# Patient Record
Sex: FEMALE | Race: WHITE | Employment: FULL TIME | URBAN - METROPOLITAN AREA
[De-identification: names, ages, dates, MRNs, and addresses within clinical notes are randomized per-mention and may not be internally consistent; named-entity substitution may affect disease eponyms.]

---

## 2017-01-17 ENCOUNTER — OFFICE VISIT (OUTPATIENT)
Dept: FAMILY MEDICINE | Facility: CLINIC | Age: 37
End: 2017-01-17
Payer: COMMERCIAL

## 2017-01-17 VITALS
SYSTOLIC BLOOD PRESSURE: 114 MMHG | BODY MASS INDEX: 26.92 KG/M2 | HEART RATE: 58 BPM | TEMPERATURE: 97.3 F | DIASTOLIC BLOOD PRESSURE: 57 MMHG | OXYGEN SATURATION: 97 % | WEIGHT: 130 LBS

## 2017-01-17 DIAGNOSIS — O35.CXX0 PREGNANCY COMPLICATED BY FETAL LUNG LESION, NOT APPLICABLE OR UNSPECIFIED FETUS: ICD-10-CM

## 2017-01-17 DIAGNOSIS — Q24.9 CONGENITAL ANOMALIES OF THE HEART: Primary | ICD-10-CM

## 2017-01-17 PROCEDURE — T1013 SIGN LANG/ORAL INTERPRETER: HCPCS | Mod: U3 | Performed by: FAMILY MEDICINE

## 2017-01-17 PROCEDURE — 99214 OFFICE O/P EST MOD 30 MIN: CPT | Performed by: FAMILY MEDICINE

## 2017-01-17 NOTE — MR AVS SNAPSHOT
After Visit Summary   1/17/2017    Adina Sánchez    MRN: [id number]           Patient Information     Date Of Birth          1980        Visit Information        Provider Department      1/17/2017 3:20 PM Willis Duenas MD; [employer] [facility]         Follow-ups after your visit        Your next 10 appointments already scheduled     Jan 30, 2017  3:20 PM   SHORT with Lauren Anneliese Engelmann, MD   [facility] ([facility])    [address] [id number] [phone]            May 03, 2017  8:00 AM   CT CHEST ANGIO W/O & W CONTRAST with UUCT4   [facility], [place], [place] ([facility], [facility])    [address] [address] [phone]           Please bring any scans or X-rays taken at other hospitals, if similar tests were done. Also bring a list of your medicines, including vitamins, minerals and over-the-counter drugs. It is safest to leave personal items at home.  Be sure to tell your doctor:   If you have any allergies.   If there s any chance you are pregnant.   If you are breastfeeding.   If you have any special needs.  You will have contrast for this exam. To prepare:   Do not eat or drink for 2 hours before your exam. If you need to take medicine, you may take it with small sips of water. (We may ask you to take liquid medicine as well.)   The day before your exam, drink extra fluids at least six 8-ounce glasses (unless your doctor tells you to restrict your fluids).  Patients over 70 or patients with diabetes or kidney problems:   If you haven t had a blood test (creatinine test) within the last 30 days, go to your clinic or Diagnostic Imaging Department for this test.  If you have diabetes:   If your kidney function is normal, continue taking your metformin (Avandamet, Glucophage, Glucovance,  "Metaglip) on the day of your exam.   If your kidney function is abnormal, wait 48 hours before restarting this medicine.  Please wear loose clothing, such as a sweat suit or jogging clothes. Avoid snaps, zippers and other metal. We may ask you to undress and put on a hospital gown.  If you have any questions, please call the Imaging Department where you will have your exam.            May 23, 2017  9:00 AM   (Arrive by 8:45 AM)   RETURN CONGENITAL HEART with Amaury Gomez MD   [facility] ([facility])    [address]  3rd Floor  [facility] [address] [phone]              Who to contact     If you have questions or need follow up information about today's clinic visit or your schedule please contact [facility] directly at [phone].  Normal or non-critical lab and imaging results will be communicated to you by MyChart, letter or phone within 4 business days after the clinic has received the results. If you do not hear from us within 7 days, please contact the clinic through MyChart or phone. If you have a critical or abnormal lab result, we will notify you by phone as soon as possible.  Submit refill requests through wrenchguys mobile or call your pharmacy and they will forward the refill request to us. Please allow 3 business days for your refill to be completed.          Additional Information About Your Visit        Yuenimeihart Information     wrenchguys mobile lets you send messages to your doctor, view your test results, renew your prescriptions, schedule appointments and more. To sign up, go to [email or web address] . Click on \"Log in\" on the left side of the screen, which will take you to the Welcome page. Then click on \"Sign up Now\" on the right side of the page.     You will be asked to enter the access code listed below, as well as some personal information. Please follow the directions to create your username and password.     Your access code " is: [email or web address]  Expires: 2017  5:31 AM     Your access code will  in 90 days. If you need help or a new code, please call your [place] clinic or [phone].        Care EveryWhere ID     This is your Care EveryWhere ID. This could be used by other organizations to access your [place] medical records  [id number]        Your Vitals Were     Pulse Temperature Pulse Oximetry Last Period Breastfeeding?       58 97.3  F (36.3  C) (Oral) 97% 04/10/2016 No        Blood Pressure from Last 3 Encounters:   17 114/57   16 124/71    Weight from Last 3 Encounters:   17 130 lb (58.968 kg)   16 140 lb 8 oz (63.73 kg)              Today, you had the following     No orders found for display       Primary Care Provider Office Phone # Fax #    Pinky Perez [phone]       [facility] [address] [place] [address] [address]  [facility] [address]        Thank you!     Thank you for choosing [facility]  for your care. Our goal is always to provide you with excellent care. Hearing back from our patients is one way we can continue to improve our services. Please take a few minutes to complete the written survey that you may receive in the mail after your visit with us. Thank you!             Your Updated Medication List - Protect others around you: Learn how to safely use, store and throw away your medicines at www.disposemymeds.org.          This list is accurate as of: 17  4:37 PM.  Always use your most recent med list.                   Brand Name Dispense Instructions for use    Prenatal Vitamin 27-0.8 MG Tabs

## 2017-01-17 NOTE — NURSING NOTE
"Chief Complaint   Patient presents with     Other     Check  Incision       Initial /57 mmHg  Pulse 58  Temp(Src) 97.3  F (36.3  C) (Oral)  Wt 130 lb (58.968 kg)  SpO2 97%  LMP 04/10/2016  Breastfeeding? No Estimated body mass index is 26.92 kg/(m^2) as calculated from the following:    Height as of 16: 4' 10.25\" (1.48 m).    Weight as of this encounter: 130 lb (58.968 kg).  BP completed using cuff size: regular  Reji MCKEON      "

## 2017-01-17 NOTE — PROGRESS NOTES
"  SUBJECTIVE:                                                    Adina Sánchez is a 36 year old female who presents to clinic today for the following health issues:  {Provider please address medication reconciliation discrepancies--rooming staff please delete if no med/rec issues}    Patient is here to check c section incision.     {additional problems for provider to add:719271}    Problem list and histories reviewed & adjusted, as indicated.  Additional history: {NONE - AS DOCUMENTED:213120::\"as documented\"}    {HIST REVIEW/ LINKS 2:981571}    {PROVIDER CHARTING PREFERENCE:431426}    "

## 2017-01-17 NOTE — PROGRESS NOTES
SUBJECTIVE:                                                    Adina Sánchez is a 36 year old female who presents to clinic today for the following health issues:    Check  Incision    She wants to know how the    Scar is doing   She is not having any problems   She is breast feeding   She is not using anything for birth control at this point     She would like to get an  IUD     Patient is not having any problems with the child   Breast feeding is going ok   This is the second child     No depression symptoms     No fever   No chills   No drainage from the scar     She had a history of some heart surgery.  This was done through an incision in the axilla. Records are  Not readily availabe, Surgery was done in [facility] when she was 11.  Per review of her echo done at [facility], no evidence of valvular surgery though this is what she said was done.  They presumed based on findings she had surgery to close and ASD and VSD.     She may have had some problems in utero.  Child supposedly had a mass that pushed the heart to the right.  She stated in the end nothing was found and the baby appears healthy now.     O: /57 mmHg  Pulse 58  Temp(Src) 97.3  F (36.3  C) (Oral)  Wt 130 lb (58.968 kg)  SpO2 97%  LMP 04/10/2016  Breastfeeding? No    The abdomen is soft without tenderness, guarding, mass, rebound or organomegaly. Bowel sounds are normal. No CVA tenderness or inguinal adenopathy noted.    Pfannenstiel scar present with good healing   No discharge or tenderness   Typical raises redness on left side of scar where suture was tied, but no discharge to indicate stitch abscess.       ICD-10-CM    1. Patient [facility] by [facility] Q24.9    2. Pregnancy complicated by fetal lung lesion, not applicable or unspecified fetus O35.8XX0      Normal initial post partum evaluation   Patient will have post partum check scheduled   She plans on having  "an IUD placed and a follow up appointment was scheduled for this  An  will be needed     She will bring additional information from [facility] for subsequent visits  Until congenital issues are clarified she should continue care with current pediatricain for the child     I do not think she will need follow up for her \"cardiac Problem.\"  Her diagnosis was listed as cardiomyopathy, but I find no evidence of that in the records.     Total time spent face-to-face with the patient: 45 minutes.  Greater than 50% of time was spent in counseling.     "

## 2017-01-30 ENCOUNTER — OFFICE VISIT (OUTPATIENT)
Dept: FAMILY MEDICINE | Facility: CLINIC | Age: 37
End: 2017-01-30
Payer: COMMERCIAL

## 2017-01-30 VITALS
OXYGEN SATURATION: 99 % | SYSTOLIC BLOOD PRESSURE: 111 MMHG | DIASTOLIC BLOOD PRESSURE: 68 MMHG | BODY MASS INDEX: 27.13 KG/M2 | HEART RATE: 62 BPM | WEIGHT: 131 LBS | TEMPERATURE: 97.4 F

## 2017-01-30 DIAGNOSIS — Z30.09 ENCOUNTER FOR OTHER GENERAL COUNSELING OR ADVICE ON CONTRACEPTION: Primary | ICD-10-CM

## 2017-01-30 DIAGNOSIS — K43.9 VENTRAL HERNIA WITHOUT OBSTRUCTION OR GANGRENE: ICD-10-CM

## 2017-01-30 PROCEDURE — 99213 OFFICE O/P EST LOW 20 MIN: CPT | Performed by: FAMILY MEDICINE

## 2017-01-30 PROCEDURE — T1013 SIGN LANG/ORAL INTERPRETER: HCPCS | Mod: U3 | Performed by: FAMILY MEDICINE

## 2017-01-30 ASSESSMENT — PAIN SCALES - GENERAL: PAINLEVEL: NO PAIN (0)

## 2017-01-30 NOTE — MR AVS SNAPSHOT
After Visit Summary   1/30/2017    Adina Sánchez    MRN: [id number]           Patient Information     Date Of Birth          1980        Visit Information        Provider Department      1/30/2017 3:15 PM Engelmann, Lauren Anneliese, MD; [facility]        Today's Diagnoses     Encounter for other general counseling or advice on contraception    -  1     Ventral hernia without obstruction or gangrene           Care Instructions    Control de la natalidad: El dispositivo intrauterino  El dispositivo intrauterino (abreviado DIU) es un aparato pequeño y flexible con forma de T que un proveedor de atención médica capacitado coloca dentro del útero. El DIU se cuenta entre los métodos anticonceptivos más eficaces y además es reversible, lo que significa que puede ser extraído en cualquier momento por un proveedor de atención médica capacitado. Los nuevos DIU son seguros y no acarrean los riesgos asociados a los DIU más antiguos.    Tasa de embarazo  Hable con garcia proveedor de atención médica acerca de la eficacia de jazmyn método de control de la natalidad.  Tipos de DIU  Hay dos tipos de DIU disponibles:    El DIU de cobre: libera samira pequeña cantidad de cobre en el útero, lo cual dificulta que los espermatozoides lleguen hasta el óvulo. Jazmyn dispositivo es efectivo ronald mínimo max 10 años.    El DIU con progestágeno: libera la hormona progestágeno (progesterona sintética), la cual ocasiona cambios en el útero que ayudan a prevenir el embarazo. Jazmyn dispositivo es efectivo ronald mínimo max 5 años y podría recomendarse a mujeres que tienen anemia o menstruaciones abundantes o dolorosas.  Los DIU tienen unos pequeños hilos que cuelgan de la abertura del útero hacia la vagina, los cuales permiten comprobar que el dispositivo permanezca en garcia posición.  Lo que debe saber sobre los DIU    Pueden usarlos mujeres que nunca villalba estado embarazadas o que tienen  antecedentes de ITS o de embarazos tubáricos.    No se desplazarán del útero a ninguna otra parte del cuerpo.    Acarrean un ligero riesgo de ser expulsados por la vagina.    Podrían no funcionar en mujeres que tienen un útero de forma anormal.    El DIU de cobre puede aumentar el flujo menstrual y causar cólicos.    El DIU que contiene progestágeno puede reducir el flujo menstrual o hacer que desaparezcan las menstruaciones (es posible y normal que se produzca manchado max los primeros 3 meses).  Asegúrese de que garcia proveedor de atención médica sepa si usted     Tiene samira ITS o podría tenerla.    Tiene problemas del hígado.    Tiene coágulos de vilma (sólo en el beny del DIU que contiene progestágeno).    Tiene cáncer de seno o antecedentes de cáncer de seno (sólo en el beny del DIU que contiene progestágeno).     [id number] [employer] [address] [place] [address], [place] [address]. Todos los derechos reservados. Esta información no pretende sustituir la atención médica profesional. Sólo garcia médico puede diagnosticar y tratar un problema de maria isabel.              Follow-ups after your visit        Your next 10 appointments already scheduled     Feb 06, 2017  1:20 PM   Office Visit with Lauren Anneliese Engelmann, MD   [facility] ([facility])    [address] [place] [address] [phone]           Bring a current list of meds and any records pertaining to this visit.  For Physicals, please bring immunization records and any forms needing to be filled out.  Please arrive 10 minutes early to complete paperwork.            Feb 08, 2017  9:00 AM   (Arrive by 8:45 AM)   New Plastic Surgery with KRIS Loya MD   [facility] ([facility])    [address] [address]   [phone]           Do not wear perfume.            May 03, 2017   8:00 AM   CT CHEST ANGIO W/O & W CONTRAST with UUCT4   [facility], [place], [place] ([facility])    [address] [address] [phone]           Please bring any scans or X-rays taken at other hospitals, if similar tests were done. Also bring a list of your medicines, including vitamins, minerals and over-the-counter drugs. It is safest to leave personal items at home.  Be sure to tell your doctor:   If you have any allergies.   If there s any chance you are pregnant.   If you are breastfeeding.   If you have any special needs.  You will have contrast for this exam. To prepare:   Do not eat or drink for 2 hours before your exam. If you need to take medicine, you may take it with small sips of water. (We may ask you to take liquid medicine as well.)   The day before your exam, drink extra fluids at least six 8-ounce glasses (unless your doctor tells you to restrict your fluids).  Patients over 70 or patients with diabetes or kidney problems:   If you haven t had a blood test (creatinine test) within the last 30 days, go to your clinic or Diagnostic Imaging Department for this test.  If you have diabetes:   If your kidney function is normal, continue taking your metformin (Avandamet, Glucophage, Glucovance, Metaglip) on the day of your exam.   If your kidney function is abnormal, wait 48 hours before restarting this medicine.  Please wear loose clothing, such as a sweat suit or jogging clothes. Avoid snaps, zippers and other metal. We may ask you to undress and put on a hospital gown.  If you have any questions, please call the Imaging Department where you will have your exam.            May 23, 2017  9:00 AM   (Arrive by 8:45 AM)   RETURN CONGENITAL HEART with Amaury Gomez MD   [facility] ([facility])    [address]  3rd Floor  [facility] [address] [phone]              Who to contact     If you  "have questions or need follow up information about today's clinic visit or your schedule please contact [facility] directly at [phone].  Normal or non-critical lab and imaging results will be communicated to you by MyChart, letter or phone within 4 business days after the clinic has received the results. If you do not hear from us within 7 days, please contact the clinic through KBLEhart or phone. If you have a critical or abnormal lab result, we will notify you by phone as soon as possible.  Submit refill requests through Akanoo or call your pharmacy and they will forward the refill request to us. Please allow 3 business days for your refill to be completed.          Additional Information About Your Visit        KBLEharSEWORKS Information     Akanoo lets you send messages to your doctor, view your test results, renew your prescriptions, schedule appointments and more. To sign up, go to [email or web address] . Click on \"Log in\" on the left side of the screen, which will take you to the Welcome page. Then click on \"Sign up Now\" on the right side of the page.     You will be asked to enter the access code listed below, as well as some personal information. Please follow the directions to create your username and password.     Your access code is: [email or web address]  Expires: 2017  6:30 AM     Your access code will  in 90 days. If you need help or a new code, please call your [place] clinic or [phone].        Care EveryWhere ID     This is your Care EveryWhere ID. This could be used by other organizations to access your [place] medical records  [id number]        Your Vitals Were     Pulse Temperature Pulse Oximetry Last Period          62 97.4  F (36.3  C) (Oral) 99% 2017         Blood Pressure from Last 3 Encounters:   17 111/68   17 114/57   16 124/71    Weight from Last 3 Encounters:   17 131 lb (59.421 kg)   17 130 lb (58.968 kg)   16 " 140 lb 8 oz (63.73 kg)              Today, you had the following     No orders found for display       Primary Care Provider Office Phone # Fax #    Pinky Perez [phone]       [facility] [address] [facility] [address]  [facility] [address]        Thank you!     Thank you for choosing [facility]  for your care. Our goal is always to provide you with excellent care. Hearing back from our patients is one way we can continue to improve our services. Please take a few minutes to complete the written survey that you may receive in the mail after your visit with us. Thank you!             Your Updated Medication List - Protect others around you: Learn how to safely use, store and throw away your medicines at www.disposemymeds.org.          This list is accurate as of: 1/30/17  3:51 PM.  Always use your most recent med list.                   Brand Name Dispense Instructions for use    Prenatal Vitamin 27-0.8 MG Tabs

## 2017-01-30 NOTE — PROGRESS NOTES
SUBJECTIVE:                                                    Adina Sánchez is a 36 year old female who presents to clinic today for the following health issues:    Patient is here for IUD consultation.   She states that she had an IUD in the past; thinks it was a Mirena ( to ).   Delivered baby (boy) 2017 via .     No periods since delivery, but did have a little bit of spotting this past Friday.     Problem list and histories reviewed & adjusted, as indicated.  Additional history: as documented    Patient Active Problem List   Diagnosis     Pregnancy complicated by fetal lung lesion     Cardiomyopathy (H)     [facility] by [facility]     Past Surgical History   Procedure Laterality Date      section         Social History   Substance Use Topics     Smoking status: Never Smoker      Smokeless tobacco: Not on file     Alcohol Use: Not on file     History reviewed. No pertinent family history.        ROS:  Constitutional, HEENT, cardiovascular, pulmonary, gi and gu systems are negative, except as otherwise noted.    OBJECTIVE:                                                    /68 mmHg  Pulse 62  Temp(Src) 97.4  F (36.3  C) (Oral)  Wt 131 lb (59.421 kg)  SpO2 99%  LMP 2017  Body mass index is 27.13 kg/(m^2).  GENERAL: healthy, alert and no distress  RESP: lungs clear to auscultation - no rales, rhonchi or wheezes  CV: regular rate and rhythm, normal S1 S2, no S3 or S4, no murmur, click or rub, no peripheral edema and peripheral pulses strong  ABDOMEN: soft, nontender, no hepatosplenomegaly, ventral hernia with incarceration noted  ABDOMEN: well-healed incision (pfannenstiel) without induration or drainage  MS: no gross musculoskeletal defects noted, no edema  BACK: no CVA tenderness, no paralumbar tenderness  PSYCH: mentation appears normal, affect normal/bright    Diagnostic Test Results:  none       ASSESSMENT/PLAN:                                                        ICD-10-CM    1. Encounter for other general counseling or advice on contraception Z30.09    2. Ventral hernia without obstruction or gangrene K43.9      Discussed risks, benefits, alternatives of Mirena IUD with patient, including bleeding, damage to surrounding tissues, and infection. She elects to proceed and would like to have it done at this clinic instead of going back to OB/Gyn.     Informed patient that if there are any complications, procedure will be aborted and she will need to go to OB/Gyn for insertion - she verbalized understanding.     Declines Micronor until then, advised abstinence or condom use until then.     See Patient Instructions    Lauren A. Engelmann, MD  [facility]

## 2017-01-30 NOTE — NURSING NOTE
"Chief Complaint   Patient presents with     Contraception     IUD consult       Initial /68 mmHg  Pulse 62  Temp(Src) 97.4  F (36.3  C) (Oral)  Wt 131 lb (59.421 kg)  SpO2 99%  LMP 01/27/2017 Estimated body mass index is 27.13 kg/(m^2) as calculated from the following:    Height as of 11/1/16: 4' 10.25\" (1.48 m).    Weight as of this encounter: 131 lb (59.421 kg).  BP completed using cuff size: regular  Trudi Spencer MA      "

## 2017-01-30 NOTE — PATIENT INSTRUCTIONS
Control de la natalidad: El dispositivo intrauterino  El dispositivo intrauterino (abreviado DIU) es un aparato pequeño y flexible con forma de T que un proveedor de atención médica capacitado coloca dentro del útero. El DIU se cuenta entre los métodos anticonceptivos más eficaces y además es reversible, lo que significa que puede ser extraído en cualquier momento por un proveedor de atención médica capacitado. Los nuevos DIU son seguros y no acarrean los riesgos asociados a los DIU más antiguos.    Tasa de embarazo  Hable con garcia proveedor de atención médica acerca de la eficacia de jazmyn método de control de la natalidad.  Tipos de DIU  Hay dos tipos de DIU disponibles:    El DIU de cobre: libera samira pequeña cantidad de cobre en el útero, lo cual dificulta que los espermatozoides lleguen hasta el óvulo. Jazmyn dispositivo es efectivo ronald mínimo max 10 años.    El DIU con progestágeno: libera la hormona progestágeno (progesterona sintética), la cual ocasiona cambios en el útero que ayudan a prevenir el embarazo. Jazmyn dispositivo es efectivo ronald mínimo max 5 años y podría recomendarse a mujeres que tienen anemia o menstruaciones abundantes o dolorosas.  Los DIU tienen unos pequeños hilos que cuelgan de la abertura del útero hacia la vagina, los cuales permiten comprobar que el dispositivo permanezca en garcia posición.  Lo que debe saber sobre los DIU    Pueden usarlos mujeres que nunca villalba estado embarazadas o que tienen antecedentes de ITS o de embarazos tubáricos.    No se desplazarán del útero a ninguna otra parte del cuerpo.    Acarrean un ligero riesgo de ser expulsados por la vagina.    Podrían no funcionar en mujeres que tienen un útero de forma anormal.    El DIU de cobre puede aumentar el flujo menstrual y causar cólicos.    El DIU que contiene progestágeno puede reducir el flujo menstrual o hacer que desaparezcan las menstruaciones (es posible y normal que se produzca manchado max los primeros 3  meses).  Asegúrese de que garcia proveedor de atención médica sepa si usted     Tiene samira ITS o podría tenerla.    Tiene problemas del hígado.    Tiene coágulos de vilma (sólo en el beny del DIU que contiene progestágeno).    Tiene cáncer de seno o antecedentes de cáncer de seno (sólo en el beny del DIU que contiene progestágeno).     [id number] [employer] [address] [place] [address]. Todos los derechos reservados. Esta información no pretende sustituir la atención médica profesional. Sólo garcia médico puede diagnosticar y tratar un problema de maria isabel.

## 2017-02-01 NOTE — PROGRESS NOTES
Adina Sánchez is a 36 year old female who presents today requesting placement of an Mirena IUD.    The patient meets and is agreeable to the following conditions:  She is not interested in conception in the near future. No  She currently is in a stable, monogamous relationship. Yes  There is no previous history of pelvic inflammatory disease. No  There is no previous history of ectopic pregnancy. No  She is willing to check monthly for the IUD string. Yes, on her own  There is no history of unresolved abnormal uterine bleeding. No  There is no history of an unresolved abnormal PAP smear. No  She has no history of diabetes, AIDS, leukemia, IV drug use or chronic steroid use. No  She is willing to return for PAP smears as directed. Yes, due  Pregnancy test today is negative. Yes      BP   Pulse 60  Temp(Src) 97.2  F (36.2  C) (Oral)  Wt 130 lb (58.968 kg)  SpO2 97%  LMP 01/27/2017    General: NAD, patient pleasant and comfortable  CV: RRR, no murmurs  Resp: Lungs CTAB  Abdomen: Soft, reducible ventral hernia identified, pfannenstiel incision palpated without tenderness and no identified erythema or induration    Procedure:   The following risks were discussed with the patient:  Possibility of pregnancy and ectopic pregnancy. Yes  Possibility of pelvic inflammatory disease, particularly with new partners. Yes  Risk of uterine perforation or IUD expulsion. Yes  Possibility of difficult removal. Yes  Spotting or heavy bleeding. Yes  Cramping, pain or infection during or after insertion. Yes    The patient was given patient information on the IUD and the patient education brochure from the . The patient has given consent to proceed with placement of the IUD. A written consent form is present in the chart. All questions answered. She wishes to proceed.    Type of IUD: Mirena  NDC: 25177-029-02    The patient has taken 800mg of Ibuprofen prior to the procedure. She is placed in a dorsal lithotomy  potion and a pelvic exam is performed to determine the position of the uterus.  The cervix is identified and cleaned with betadine three times.  A single tooth tenaculum is applied to the anterior lip of the cervix for stabilization.  The uterus is sounded to 7.0 cm and removed. (Target sound depth is 6.5 cm to 8.5 cm.)  The IUD insertion tube is prepared to manufacturers recommendations and inserted into the uterus under sterile conditions to the sounding depth.   The arms of the IUD are then opened by withdrawing the insertion tube 2.0 cm while stabilizing the solid insertion tre without difficulty.  The IUD string is then cut to 3.0 cm.    The patient tolerated this procedure without immediate complication.  The patient is to return or call immediately for any unexplained fever, abdominal or pelvic pain, excessive bleeding, possibility of pregnancy, foul-smelling discharge, or sense that the IUD has been expelled.    Return to clinic as scheduled on 2/17/17 for string check.

## 2017-02-06 ENCOUNTER — OFFICE VISIT (OUTPATIENT)
Dept: FAMILY MEDICINE | Facility: CLINIC | Age: 37
End: 2017-02-06
Payer: COMMERCIAL

## 2017-02-06 VITALS — OXYGEN SATURATION: 97 % | TEMPERATURE: 97.2 F | BODY MASS INDEX: 26.92 KG/M2 | WEIGHT: 130 LBS | HEART RATE: 60 BPM

## 2017-02-06 DIAGNOSIS — Z30.430 ENCOUNTER FOR IUD INSERTION: Primary | ICD-10-CM

## 2017-02-06 LAB — BETA HCG QUAL IFA URINE: NEGATIVE

## 2017-02-06 PROCEDURE — 58300 INSERT INTRAUTERINE DEVICE: CPT | Performed by: FAMILY MEDICINE

## 2017-02-06 PROCEDURE — 84703 CHORIONIC GONADOTROPIN ASSAY: CPT | Performed by: FAMILY MEDICINE

## 2017-02-06 PROCEDURE — T1013 SIGN LANG/ORAL INTERPRETER: HCPCS | Mod: U3 | Performed by: FAMILY MEDICINE

## 2017-02-06 PROCEDURE — 99213 OFFICE O/P EST LOW 20 MIN: CPT | Mod: 25 | Performed by: FAMILY MEDICINE

## 2017-02-06 ASSESSMENT — PAIN SCALES - GENERAL: PAINLEVEL: NO PAIN (0)

## 2017-02-06 NOTE — MR AVS SNAPSHOT
After Visit Summary   2/6/2017    Adina Sánchez    MRN: [id number]           Patient Information     Date Of Birth          1980        Visit Information        Provider Department      2/6/2017 1:20 PM Engelmann, Lauren Anneliese, MD; [employer] [facility]        Today's Diagnoses     Encounter for IUD insertion    -  1       Care Instructions    Call the clinic if you have severe pain or any abnormal vaginal discharge.   Call the clinic or go to the ER if you have bleeding from the vagina that is more than 1 pad in a 2.5 hour (or shorter period), or if you have any other concerns or questions.     Control de la natalidad: El dispositivo intrauterino  El dispositivo intrauterino (abreviado DIU) es un aparato pequeño y flexible con forma de T que un proveedor de atención médica capacitado coloca dentro del útero. El DIU se cuenta entre los métodos anticonceptivos más eficaces y además es reversible, lo que significa que puede ser extraído en cualquier momento por un proveedor de atención médica capacitado. Los nuevos DIU son seguros y no acarrean los riesgos asociados a los DIU más antiguos.    Tasa de embarazo  Hable con garcia proveedor de atención médica acerca de la eficacia de jazmyn método de control de la natalidad.  Tipos de DIU  Hay dos tipos de DIU disponibles:    El DIU de cobre: libera samira pequeña cantidad de cobre en el útero, lo cual dificulta que los espermatozoides lleguen hasta el óvulo. Jazmyn dispositivo es efectivo ronald mínimo max 10 años.    El DIU con progestágeno: libera la hormona progestágeno (progesterona sintética), la cual ocasiona cambios en el útero que ayudan a prevenir el embarazo. Jazmyn dispositivo es efectivo ronald mínimo max 5 años y podría recomendarse a mujeres que tienen anemia o menstruaciones abundantes o dolorosas.  Los DIU tienen unos pequeños hilos que cuelgan de la abertura del útero hacia la vagina, los cuales  permiten comprobar que el dispositivo permanezca en garcia posición.  Lo que debe saber sobre los DIU    Pueden usarlos mujeres que nunca villalba estado embarazadas o que tienen antecedentes de ITS o de embarazos tubáricos.    No se desplazarán del útero a ninguna otra parte del cuerpo.    Acarrean un ligero riesgo de ser expulsados por la vagina.    Podrían no funcionar en mujeres que tienen un útero de forma anormal.    El DIU de cobre puede aumentar el flujo menstrual y causar cólicos.    El DIU que contiene progestágeno puede reducir el flujo menstrual o hacer que desaparezcan las menstruaciones (es posible y normal que se produzca manchado max los primeros 3 meses).  Asegúrese de que garcia proveedor de atención médica sepa si usted     Tiene samira ITS o podría tenerla.    Tiene problemas del hígado.    Tiene coágulos de vilma (sólo en el beny del DIU que contiene progestágeno).    Tiene cáncer de seno o antecedentes de cáncer de seno (sólo en el beny del DIU que contiene progestágeno).     [id number] [employer]. [address] [place] [address]. Todos los derechos reservados. Esta información no pretende sustituir la atención médica profesional. Sólo garcia médico puede diagnosticar y tratar un problema de maria isabel.              Follow-ups after your visit        Your next 10 appointments already scheduled     Feb 08, 2017  8:45 AM   New Plastic Surgery with KRIS Loya MD, Skyler Snowden   [facility] ([facility])    [address] [address] [phone]           Do not wear perfume.            Feb 17, 2017  4:00 PM   Office Visit with Lauren Anneliese Engelmann, [facility]    [address] [id number] [phone]           Bring a current list of meds and any records pertaining to this  visit.  For Physicals, please bring immunization records and any forms needing to be filled out.  Please arrive 10 minutes early to complete paperwork.            May 03, 2017  8:00 AM   CT CHEST ANGIO W/O & W CONTRAST with UUCT4   [facility], [place], [place] ([facility])    [address] [address] [phone]           Please bring any scans or X-rays taken at other hospitals, if similar tests were done. Also bring a list of your medicines, including vitamins, minerals and over-the-counter drugs. It is safest to leave personal items at home.  Be sure to tell your doctor:   If you have any allergies.   If there s any chance you are pregnant.   If you are breastfeeding.   If you have any special needs.  You will have contrast for this exam. To prepare:   Do not eat or drink for 2 hours before your exam. If you need to take medicine, you may take it with small sips of water. (We may ask you to take liquid medicine as well.)   The day before your exam, drink extra fluids at least six 8-ounce glasses (unless your doctor tells you to restrict your fluids).  Patients over 70 or patients with diabetes or kidney problems:   If you haven t had a blood test (creatinine test) within the last 30 days, go to your clinic or Diagnostic Imaging Department for this test.  If you have diabetes:   If your kidney function is normal, continue taking your metformin (Avandamet, Glucophage, Glucovance, Metaglip) on the day of your exam.   If your kidney function is abnormal, wait 48 hours before restarting this medicine.  Please wear loose clothing, such as a sweat suit or jogging clothes. Avoid snaps, zippers and other metal. We may ask you to undress and put on a hospital gown.  If you have any questions, please call the Imaging Department where you will have your exam.            May 23, 2017  9:00 AM   (Arrive by 8:45 AM)   RETURN CONGENITAL HEART with Amaury Gomez MD  "  [facility] ([facility])    [address]  3rd Floor  [facility] [address] [phone]              Who to contact     If you have questions or need follow up information about today's clinic visit or your schedule please contact [facility] directly at [phone].  Normal or non-critical lab and imaging results will be communicated to you by MyChart, letter or phone within 4 business days after the clinic has received the results. If you do not hear from us within 7 days, please contact the clinic through MyChart or phone. If you have a critical or abnormal lab result, we will notify you by phone as soon as possible.  Submit refill requests through [employer] or call your pharmacy and they will forward the refill request to us. Please allow 3 business days for your refill to be completed.          Additional Information About Your Visit        [employer] Information     [employer] lets you send messages to your doctor, view your test results, renew your prescriptions, schedule appointments and more. To sign up, go to [email or web address] . Click on \"Log in\" on the left side of the screen, which will take you to the Welcome page. Then click on \"Sign up Now\" on the right side of the page.     You will be asked to enter the access code listed below, as well as some personal information. Please follow the directions to create your username and password.     Your access code is: [email or web address]  Expires: 2017  6:30 AM     Your access code will  in 90 days. If you need help or a new code, please call your [facility] or [phone].        Care EveryWhere ID     This is your Care EveryWhere ID. This could be used by other organizations to access your [place] medical records  [id number]        Your Vitals Were     Pulse Temperature Pulse Oximetry Last Period          60 97.2  F (36.2  C) (Oral) 97% 2017         Blood Pressure from " Last 3 Encounters:   01/30/17 111/68   01/17/17 114/57   11/01/16 124/71    Weight from Last 3 Encounters:   02/06/17 130 lb (58.968 kg)   01/30/17 131 lb (59.421 kg)   01/17/17 130 lb (58.968 kg)              We Performed the Following     Beta HCG qual IFA urine     INSERTION INTRAUTERINE DEVICE          Today's Medication Changes          These changes are accurate as of: 2/6/17  2:38 PM.  If you have any questions, ask your nurse or doctor.               Start taking these medicines.        Dose/Directions    levonorgestrel 20 MCG/24HR IUD   Commonly known as:  MIRENA   Used for:  Encounter for IUD insertion   Started by:  Engelmann, Lauren Anneliese, MD        Dose:  1 each   1 each (20 mcg) by Intrauterine route once for 1 dose   Quantity:  1 each   Refills:  0            Where to get your medicines      Some of these will need a paper prescription and others can be bought over the counter.  Ask your nurse if you have questions.     You don't need a prescription for these medications    - levonorgestrel 20 MCG/24HR IUD             Primary Care Provider Office Phone # Fax #    Pinky Gibran Perez [phone]       [facility] [address] [place] [address]        Thank you!     Thank you for choosing [facility]  for your care. Our goal is always to provide you with excellent care. Hearing back from our patients is one way we can continue to improve our services. Please take a few minutes to complete the written survey that you may receive in the mail after your visit with us. Thank you!             Your Updated Medication List - Protect others around you: Learn how to safely use, store and throw away your medicines at www.disposemymeds.org.          This list is accurate as of: 2/6/17  2:38 PM.  Always use your most recent med list.                   Brand Name Dispense Instructions for use    levonorgestrel 20 MCG/24HR IUD    MIRENA    1 each     1 each (20 mcg) by Intrauterine route once for 1 dose       Prenatal Vitamin 27-0.8 MG Tabs

## 2017-02-06 NOTE — NURSING NOTE
"Chief Complaint   Patient presents with     IUD     PLACEMENT       Initial BP   Pulse 60  Temp(Src) 97.2  F (36.2  C) (Oral)  Wt 130 lb (58.968 kg)  SpO2 97%  LMP 01/27/2017 Estimated body mass index is 26.92 kg/(m^2) as calculated from the following:    Height as of 11/1/16: 4' 10.25\" (1.48 m).    Weight as of this encounter: 130 lb (58.968 kg).  Medication Reconciliation: complete   Trudi Spencer MA      "

## 2017-02-06 NOTE — PATIENT INSTRUCTIONS
Call the clinic if you have severe pain or any abnormal vaginal discharge.   Call the clinic or go to the ER if you have bleeding from the vagina that is more than 1 pad in a 2.5 hour (or shorter period), or if you have any other concerns or questions.     Control de la natalidad: El dispositivo intrauterino  El dispositivo intrauterino (abreviado DIU) es un aparato pequeño y flexible con forma de T que un proveedor de atención médica capacitado coloca dentro del útero. El DIU se cuenta entre los métodos anticonceptivos más eficaces y además es reversible, lo que significa que puede ser extraído en cualquier momento por un proveedor de atención médica capacitado. Los nuevos DIU son seguros y no acarrean los riesgos asociados a los DIU más antiguos.    Tasa de embarazo  Hable con garcia proveedor de atención médica acerca de la eficacia de jazmyn método de control de la natalidad.  Tipos de DIU  Hay dos tipos de DIU disponibles:    El DIU de cobre: libera samira pequeña cantidad de cobre en el útero, lo cual dificulta que los espermatozoides lleguen hasta el óvulo. Jazmyn dispositivo es efectivo ronald mínimo max 10 años.    El DIU con progestágeno: libera la hormona progestágeno (progesterona sintética), la cual ocasiona cambios en el útero que ayudan a prevenir el embarazo. Jazmyn dispositivo es efectivo ronald mínimo max 5 años y podría recomendarse a mujeres que tienen anemia o menstruaciones abundantes o dolorosas.  Los DIU tienen unos pequeños hilos que cuelgan de la abertura del útero hacia la vagina, los cuales permiten comprobar que el dispositivo permanezca en garcia posición.  Lo que debe saber sobre los DIU    Pueden usarlos mujeres que nunca villalba estado embarazadas o que tienen antecedentes de ITS o de embarazos tubáricos.    No se desplazarán del útero a ninguna otra parte del cuerpo.    Acarrean un ligero riesgo de ser expulsados por la vagina.    Podrían no funcionar en mujeres que tienen un útero de forma  anormal.    El DIU de cobre puede aumentar el flujo menstrual y causar cólicos.    El DIU que contiene progestágeno puede reducir el flujo menstrual o hacer que desaparezcan las menstruaciones (es posible y normal que se produzca manchado max los primeros 3 meses).  Asegúrese de que garcia proveedor de atención médica sepa si usted     Tiene samira ITS o podría tenerla.    Tiene problemas del hígado.    Tiene coágulos de vilma (sólo en el beny del DIU que contiene progestágeno).    Tiene cáncer de seno o antecedentes de cáncer de seno (sólo en el beny del DIU que contiene progestágeno).     [id number] [employer]. [address] [place] [id number]. Todos los derechos reservados. Esta información no pretende sustituir la atención médica profesional. Sólo garcia médico puede diagnosticar y tratar un problema de maria isabel.

## 2017-02-07 NOTE — PROGRESS NOTES
[address] [address]  [phone]  Dept: [phone]    PRE-OP EVALUATION:  Today's date: 2017    Adina Sánchez (: 1980) presents for pre-operative evaluation assessment as requested by [facility]. She requires evaluation and anesthesia risk assessment prior to undergoing surgery/procedure for treatment of hernia repair. Proposed procedure: Hernia repair  Date of Surgery/ Procedure: 17  Time of Surgery/ Procedure: 2:30 PM  Hospital/[place]urgical Facility[place] [facility] [place]  Primary Physician: Lauren Engelmann, MD   Type of Anesthesia Anticipated: General  Patient has a Health Care Directive or Living Will:  NO    1. YES - DO YOU HAVE A HISTORY OF HEART ATTACK, STROKE, STENT, BYPASS OR SURGERY ON AN ARTERY IN THE HEAD, NECK, HEART OR LEG? [facility] age 11 [facility] followed in the adult congenital cardiology clinic  2. NO - Do you ever have any pain or discomfort in your chest?  3. NO - Do you have a history of  Heart Failure?  4. NO - Are you troubled by shortness of breath when: walking on the level, up a slight hill or at night?  5. NO - Do you currently have a cold, bronchitis or other respiratory infection?  6. NO - Do you have a cough, shortness of breath or wheezing?  7. NO - Do you sometimes get pains in the calves of your legs when you walk?  8. NO - Do you or anyone in your family have previous history of blood clots?  9. NO - Do you or does anyone in your family have a serious bleeding problem such as prolonged bleeding following surgeries or cuts?  10. NO - Have you ever had problems with anemia or been told to take iron pills?  11. NO - Have you had any abnormal blood loss such as black, tarry or bloody stools, or abnormal vaginal bleeding?  12. YES - HAVE YOU EVER HAD A BLOOD TRANSFUSION? During cardiac surgery at age 11  13. NO - Have you or any of your relatives ever  had problems with anesthesia?  14. YES - DO YOU HAVE SLEEP APNEA, EXCESSIVE SNORING OR DAYTIME DROWSINESS? Snoring occasionally  15. NO - DO YOU HAVE ANY PROSTHETIC HEART VALVES?   16. NO - Do you have prosthetic joints?  17. NO - Is there any chance that you may be pregnant?    HPI:                                                      Brief HPI related to upcoming procedure: Repair of ventral hernia.     See problem list for active medical problems.  Problems all longstanding and stable, except as noted/documented.  See ROS for pertinent symptoms related to these conditions.                                                                                                  .    MEDICAL HISTORY:                                                      Patient Active Problem List    Diagnosis Date Noted     Keloid scar 2017     Priority: Medium     Encounter for IUD insertion 2017     Priority: Medium     17 Insertion  EXP: 2019  LOT#: [id number]       Patient [facility] by the [facility] 10/23/2016     Priority: Medium     For urgent after hours needs, please call [phone] and ask to speak with the Adult Congenital Heart Disease Physician on call - mention job code [id number].         Cardiomyopathy (H)      Priority: Medium     Pregnancy complicated by fetal lung lesion 10/14/2016     Priority: Medium     Left type 2 CPAM        Past Medical History   Diagnosis Date     Cardiomyopathy (H)      Past Surgical History   Procedure Laterality Date      section       Current Outpatient Prescriptions   Medication Sig Dispense Refill     Prenatal Vit-Fe Fumarate-FA (PRENATAL VITAMIN) 27-0.8 MG TABS        levonorgestrel (MIRENA) 20 MCG/24HR IUD 1 each (20 mcg) by Intrauterine route once for 1 dose 1 each 0     OTC products: herbals and vitamins - prenatal vitamins    No Known Allergies   Latex Allergy: NO    Social History   Substance Use Topics     Smoking status:  Never Smoker     Smokeless tobacco: Not on file     Alcohol use Not on file     History   Drug Use Not on file       REVIEW OF SYSTEMS:                                                    C: NEGATIVE for fever, chills, change in weight  I: NEGATIVE for worrisome rashes, moles or lesions  E: NEGATIVE for vision changes or irritation  E/M: NEGATIVE for ear, mouth and throat problems  R: NEGATIVE for significant cough or SOB  B: NEGATIVE for masses, tenderness or discharge  CV: NEGATIVE for chest pain, palpitations or peripheral edema  GI: NEGATIVE for nausea, abdominal pain, heartburn, or change in bowel habits  : NEGATIVE for frequency, dysuria, or hematuria  M: NEGATIVE for significant arthralgias or myalgia  N: NEGATIVE for weakness, dizziness or paresthesias  E: NEGATIVE for temperature intolerance, skin/hair changes  H: NEGATIVE for bleeding problems  P: NEGATIVE for changes in mood or affect    EXAM:                                                    BP 99/66 (BP Location: Left arm, Patient Position: Chair, Cuff Size: Adult Regular)  Pulse 84  Temp 97.5  F (36.4  C) (Oral)  Wt 127 lb (57.6 kg)  LMP 01/27/2017  SpO2 97%  BMI 26.32 kg/m2    GENERAL APPEARANCE: healthy, alert and no distress     EYES: EOMI,- PERRL     HENT: ear canals and TM's normal and nose and mouth without ulcers or lesions     NECK: no adenopathy, no asymmetry, masses, or scars and thyroid normal to palpation     RESP: lungs clear to auscultation - no rales, rhonchi or wheezes     BREAST: normal without masses, tenderness or nipple discharge and no palpable axillary masses or adenopathy     CV: regular rates and rhythm, normal S1 S2, no S3 or S4 and no murmur, click or rub -     ABDOMEN:  soft, nontender, no HSM or masses and bowel sounds normal     GU_female: IUD strings visualized     MS: extremities normal- no gross deformities noted, no evidence of inflammation in joints, FROM in all extremities.     SKIN: large keloid, about 30 cm, on  left lateral thorax     NEURO: Normal strength and tone, sensory exam grossly normal, mentation intact and speech normal     PSYCH: mentation appears normal. and affect normal/bright     LYMPHATICS: No axillary, cervical, inguinal, or supraclavicular nodes    DIAGNOSTICS:                                                      Hemoglobin (indicated for history of anemia or procedure with significant blood loss such as tonsillectomy, major intraperitoneal surgery, vascular surgery, major spine surgery, total joint replacement)  Serum Creatinine    Labs Drawn and in Process:   CBC with platelets  Creatinine    No results for input(s): HGB, PLT, INR, NA, POTASSIUM, CR, A1C in the last 50351 hours.     IMPRESSION:                                                    Reason for surgery/procedure: Repair of ventral hernia    The proposed surgical procedure is considered INTERMEDIATE risk.    REVISED CARDIAC RISK INDEX  The patient has the following serious cardiovascular risks for perioperative complications such as (MI, PE, VFib and 3  AV Block):  No serious cardiac risks  INTERPRETATION: 0 risks: Class I (very low risk - 0.4% complication rate)    The patient has the following additional risks for perioperative complications:  No identified additional risks      ICD-10-CM    1. Preop general physical exam Z01.818 CBC with platelets     Creatinine   2. Ventral hernia without obstruction or gangrene K43.9 CBC with platelets     Creatinine       RECOMMENDATIONS:                                                      --Patient is to take all scheduled medications on the day of surgery.    APPROVAL GIVEN to proceed with proposed procedure, without further diagnostic evaluation     Signed Electronically by: Lauren A. Engelmann, MD    Copy of this evaluation report is provided to requesting physician.    [place] Preop Guidelines

## 2017-02-08 ENCOUNTER — OFFICE VISIT (OUTPATIENT)
Dept: PLASTIC SURGERY | Facility: CLINIC | Age: 37
End: 2017-02-08
Attending: INTERNAL MEDICINE

## 2017-02-08 DIAGNOSIS — L91.0 KELOID SCAR: Primary | ICD-10-CM

## 2017-02-08 NOTE — Clinical Note
2/8/2017       RE: Adina Sánchez  [address]  [facility] [address]     Dear Colleague,    Thank you for referring your patient, Adina Sánchez, to the [facility] PLASTIC AND RECONSTRUCTIVE SURGERY at [facility]. Please see a copy of my visit note below.    REFERRING PHYSICIAN:  Amaury Gomez, Cardiology.      PRESENTING COMPLAINT:  Consultation for a keloid of the left lateral chest.      HISTORY OF PRESENT ILLNESS:  Ms. Sánchez is 36 years old.  At the age of 11, she underwent some kind of cardiac surgery, unknown details, for congenital heart disease.  This was done in [facility].  Over the years, that lateral thoracotomy incision has developed a keloid.  It has become tender to the touch, especially when she wears a bra.  It sometimes bleeds and she states it to be 6/10 pain.  She has had some steroid injections when she was a child in that area but nothing recently.  It has stopped growing over the last few years and been stable in size.  She has noticed other keloids in other areas of her body as well.      PAST MEDICAL HISTORY:  History of cardiomyopathy, currently no issues with it.  Has had a full cardiac workup which has all come back clean.      PAST SURGICAL HISTORY:  Two C-sections, thoracotomy and cardiac surgery.      MEDICATIONS:  Mirena and prenatal vitamins.      ALLERGIES:  Nil.      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      REVIEW OF SYSTEMS:  Denies chest pain, shortness of breath, MI, CVA, DVT and PE.      PHYSICAL EXAMINATION:  Vital signs stable.  She is afebrile, in no obvious distress.  She is 130 pounds with a BMI of 29 kg/m2.  On examination of her left chest, she has a left lateral thoracotomy incision with an obvious keloid.  The total dimension is probably 30 cm long, about 4-5 cm wide.  On my exam, I do not find it to be tender.      ASSESSMENT AND PLAN:  Based  above findings, diagnosis of a left chest keloid was made.  I had a long discussion through an  about the diagnosis.  Explained to the patient that options include doing nothing versus excision and steroid injection versus excision and 5-FU injection versus stage excision and radiation therapy.  I was very clear about the fact that I cannot guarantee that the symptoms will go away and that there are chances that this keloid will recur and maybe even be worse off in terms of the size and symptoms.  It is not that that will happen, it is the risk that we take when we do this kind of procedure.  I explained to the patient that at this time to me it seems that the patient's symptoms are minimal, although the biggest concern is the fact that whenever she wears a bra it causes discomfort.  Options do include trying to minimize that by covering the scar or using bras that do not traverse the scar itself.  Nonetheless, it is up to the patient if she wants to proceed.  She has just recently given birth to a child.  Another aspect to think about is that tension does worsen the outcome and is a risk factor for worsening of the keloid and over the next year obviously the patient will be lifting her child, which will cause more tension on the area.  Therefore, another reason to maybe hold off on doing anything at this moment in time.  Nonetheless, ultimately the patient has to decide whether she wants to proceed.  The patient will let me know All questions were answered.  She was happy with the visit.      Total time spent with the patient 30 minutes, more than half counseling.        Again, thank you for allowing me to participate in the care of your patient.      Sincerely,    KRIS Loya MD      cc:   Amaury Gomez MD   CCR   [address] [place] [address] 4 137   [facility] [address]

## 2017-02-08 NOTE — PROGRESS NOTES
REFERRING PHYSICIAN:  Amaury Gomez, Cardiology.      PRESENTING COMPLAINT:  Consultation for a keloid of the left lateral chest.      HISTORY OF PRESENT ILLNESS:  Ms. Sánchez is 36 years old.  At the age of 11, she underwent some kind of cardiac surgery, unknown details, for congenital heart disease.  This was done in [facility].  Over the years, that lateral thoracotomy incision has developed a keloid.  It has become tender to the touch, especially when she wears a bra.  It sometimes bleeds and she states it to be 6/10 pain.  She has had some steroid injections when she was a child in that area but nothing recently.  It has stopped growing over the last few years and been stable in size.  She has noticed other keloids in other areas of her body as well.      PAST MEDICAL HISTORY:  History of cardiomyopathy, currently no issues with it.  Has had a full cardiac workup which has all come back clean.      PAST SURGICAL HISTORY:  Two C-sections, thoracotomy and cardiac surgery.      MEDICATIONS:  Mirena and prenatal vitamins.      ALLERGIES:  Nil.      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      REVIEW OF SYSTEMS:  Denies chest pain, shortness of breath, MI, CVA, DVT and PE.      PHYSICAL EXAMINATION:  Vital signs stable.  She is afebrile, in no obvious distress.  She is 130 pounds with a BMI of 29 kg/m2.  On examination of her left chest, she has a left lateral thoracotomy incision with an obvious keloid.  The total dimension is probably 30 cm long, about 4-5 cm wide.  On my exam, I do not find it to be tender.      ASSESSMENT AND PLAN:  Based above findings, diagnosis of a left chest keloid was made.  I had a long discussion through an  about the diagnosis.  Explained to the patient that options include doing nothing versus excision and steroid injection versus excision and 5-FU injection versus stage excision and radiation therapy.  I was very  clear about the fact that I cannot guarantee that the symptoms will go away and that there are chances that this keloid will recur and maybe even be worse off in terms of the size and symptoms.  It is not that that will happen, it is the risk that we take when we do this kind of procedure.  I explained to the patient that at this time to me it seems that the patient's symptoms are minimal, although the biggest concern is the fact that whenever she wears a bra it causes discomfort.  Options do include trying to minimize that by covering the scar or using bras that do not traverse the scar itself.  Nonetheless, it is up to the patient if she wants to proceed.  She has just recently given birth to a child.  Another aspect to think about is that tension does worsen the outcome and is a risk factor for worsening of the keloid and over the next year obviously the patient will be lifting her child, which will cause more tension on the area.  Therefore, another reason to maybe hold off on doing anything at this moment in time.  Nonetheless, ultimately the patient has to decide whether she wants to proceed.  The patient will let me know All questions were answered.  She was happy with the visit.      Total time spent with the patient 30 minutes, more than half counseling.      cc:   Amaury Gomez MD   CCRV   [address] [place] [address] 4 137   [facility] [address]

## 2017-02-17 ENCOUNTER — OFFICE VISIT (OUTPATIENT)
Dept: FAMILY MEDICINE | Facility: CLINIC | Age: 37
End: 2017-02-17
Payer: MEDICAID

## 2017-02-17 VITALS
BODY MASS INDEX: 26.32 KG/M2 | TEMPERATURE: 97.5 F | WEIGHT: 127 LBS | HEART RATE: 84 BPM | DIASTOLIC BLOOD PRESSURE: 66 MMHG | OXYGEN SATURATION: 97 % | SYSTOLIC BLOOD PRESSURE: 99 MMHG

## 2017-02-17 DIAGNOSIS — Z01.818 PREOP GENERAL PHYSICAL EXAM: Primary | ICD-10-CM

## 2017-02-17 DIAGNOSIS — K43.9 VENTRAL HERNIA WITHOUT OBSTRUCTION OR GANGRENE: ICD-10-CM

## 2017-02-17 LAB
ERYTHROCYTE [DISTWIDTH] IN BLOOD BY AUTOMATED COUNT: 12.9 % (ref 10–15)
HCT VFR BLD AUTO: 40.8 % (ref 35–47)
HGB BLD-MCNC: 13.8 G/DL (ref 11.7–15.7)
MCH RBC QN AUTO: 29.2 PG (ref 26.5–33)
MCHC RBC AUTO-ENTMCNC: 33.8 G/DL (ref 31.5–36.5)
MCV RBC AUTO: 86 FL (ref 78–100)
PLATELET # BLD AUTO: 242 10E9/L (ref 150–450)
RBC # BLD AUTO: 4.72 10E12/L (ref 3.8–5.2)
WBC # BLD AUTO: 7.1 10E9/L (ref 4–11)

## 2017-02-17 PROCEDURE — 85027 COMPLETE CBC AUTOMATED: CPT | Performed by: FAMILY MEDICINE

## 2017-02-17 PROCEDURE — 36415 COLL VENOUS BLD VENIPUNCTURE: CPT | Performed by: FAMILY MEDICINE

## 2017-02-17 PROCEDURE — T1013 SIGN LANG/ORAL INTERPRETER: HCPCS | Mod: U3 | Performed by: FAMILY MEDICINE

## 2017-02-17 PROCEDURE — 99214 OFFICE O/P EST MOD 30 MIN: CPT | Performed by: FAMILY MEDICINE

## 2017-02-17 PROCEDURE — 82565 ASSAY OF CREATININE: CPT | Performed by: FAMILY MEDICINE

## 2017-02-17 ASSESSMENT — PAIN SCALES - GENERAL: PAINLEVEL: NO PAIN (0)

## 2017-02-17 NOTE — MR AVS SNAPSHOT
After Visit Summary   2/17/2017    Adina Sánchez    MRN: [id number]           Patient Information     Date Of Birth          1980        Visit Information        Provider Department      2/17/2017 4:00 PM Engelmann, Lauren Anneliese, MD; [facility]        Today's Diagnoses     Preop general physical exam    -  1    Ventral hernia without obstruction or gangrene          Care Instructions      Before Your Surgery      Call your surgeon if there is any change in your health. This includes signs of a cold or flu (such as a sore throat, runny nose, cough, rash or fever).    Do not smoke, drink alcohol or take over the counter medicine (unless your surgeon or primary care doctor tells you to) for the 24 hours before and after surgery.    If you take prescribed drugs: Follow your doctor s orders about which medicines to take and which to stop until after surgery.    Eating and drinking prior to surgery: follow the instructions from your surgeon    Take a shower or bath the night before surgery. Use the soap your surgeon gave you to gently clean your skin. If you do not have soap from your surgeon, use your regular soap. Do not shave or scrub the surgery site.  Wear clean pajamas and have clean sheets on your bed.         Follow-ups after your visit        Your next 10 appointments already scheduled     May 03, 2017  8:00 AM CDT   CT CHEST ANGIO W/O & W CONTRAST with UUCT4   [facility], [place], [place] ([facility], [facility])    [address] [facility] [address] [phone]           Please bring any scans or X-rays taken at other hospitals, if similar tests were done. Also bring a list of your medicines, including vitamins, minerals and over-the-counter drugs. It is safest to leave personal items at home.  Be sure to tell your doctor:   If you have any allergies.   If there s any chance you are pregnant.   If  you are breastfeeding.   If you have any special needs.  You will have contrast for this exam. To prepare:   Do not eat or drink for 2 hours before your exam. If you need to take medicine, you may take it with small sips of water. (We may ask you to take liquid medicine as well.)   The day before your exam, drink extra fluids at least six 8-ounce glasses (unless your doctor tells you to restrict your fluids).  Patients over 70 or patients with diabetes or kidney problems:   If you haven t had a blood test (creatinine test) within the last 30 days, go to your clinic or Diagnostic Imaging Department for this test.  If you have diabetes:   If your kidney function is normal, continue taking your metformin (Avandamet, Glucophage, Glucovance, Metaglip) on the day of your exam.   If your kidney function is abnormal, wait 48 hours before restarting this medicine.  Please wear loose clothing, such as a sweat suit or jogging clothes. Avoid snaps, zippers and other metal. We may ask you to undress and put on a hospital gown.  If you have any questions, please call the Imaging Department where you will have your exam.            May 23, 2017  9:00 AM CDT   (Arrive by 8:45 AM)   RETURN CONGENITAL HEART with Amaury Gomez MD   [facility] ([facility])    [address] [place] [address] [phone]              Who to contact     If you have questions or need follow up information about today's clinic visit or your schedule please contact [facility] directly at [phone].  Normal or non-critical lab and imaging results will be communicated to you by MyChart, letter or phone within 4 business days after the clinic has received the results. If you do not hear from us within 7 days, please contact the clinic through MyChart or phone. If you have a critical or abnormal lab result, we will notify you by phone as soon as possible.  Submit refill  "requests through [employer] or call your pharmacy and they will forward the refill request to us. Please allow 3 business days for your refill to be completed.          Additional Information About Your Visit        [employer] Information     [employer] lets you send messages to your doctor, view your test results, renew your prescriptions, schedule appointments and more. To sign up, go to [email or web address] . Click on \"Log in\" on the left side of the screen, which will take you to the Welcome page. Then click on \"Sign up Now\" on the right side of the page.     You will be asked to enter the access code listed below, as well as some personal information. Please follow the directions to create your username and password.     Your access code is: [email or web address]  Expires: 2017  6:30 AM     Your access code will  in 90 days. If you need help or a new code, please call your [facility] clinic or [phone].        Care EveryWhere ID     This is your Care EveryWhere ID. This could be used by other organizations to access your [facility] medical records  [id number]        Your Vitals Were     Pulse Temperature Last Period Pulse Oximetry BMI (Body Mass Index)       84 97.5  F (36.4  C) (Oral) 2017 97% 26.32 kg/m2        Blood Pressure from Last 3 Encounters:   17 99/66   17 111/68   17 114/57    Weight from Last 3 Encounters:   17 127 lb (57.6 kg)   17 130 lb (59 kg)   17 131 lb (59.4 kg)              We Performed the Following     CBC with platelets     Creatinine        Primary Care Provider Office Phone # Fax #    Lauren Anneliese Engelmann, [phone]       [facility] [address]  [place] [address]        Thank you!     Thank you for choosing [facility]  for your care. Our goal is always to provide you with excellent care. Hearing back from our patients is one way we can continue to improve our " services. Please take a few minutes to complete the written survey that you may receive in the mail after your visit with us. Thank you!             Your Updated Medication List - Protect others around you: Learn how to safely use, store and throw away your medicines at www.disposemymeds.org.          This list is accurate as of: 2/17/17  4:57 PM.  Always use your most recent med list.                   Brand Name Dispense Instructions for use    levonorgestrel 20 MCG/24HR IUD    MIRENA    1 each    1 each (20 mcg) by Intrauterine route once for 1 dose       Prenatal Vitamin 27-0.8 MG Tabs

## 2017-02-17 NOTE — NURSING NOTE
"Chief Complaint   Patient presents with     Pre-Op Exam       Initial BP 99/66 (BP Location: Left arm, Patient Position: Chair, Cuff Size: Adult Regular)  Pulse 84  Temp 97.5  F (36.4  C) (Oral)  Wt 127 lb (57.6 kg)  LMP 01/27/2017  SpO2 97%  BMI 26.32 kg/m2 Estimated body mass index is 26.32 kg/(m^2) as calculated from the following:    Height as of 11/1/16: 4' 10.25\" (1.48 m).    Weight as of this encounter: 127 lb (57.6 kg).  Medication Reconciliation: complete   Trudi Spencer MA      "

## 2017-02-20 LAB
CREAT SERPL-MCNC: 0.55 MG/DL (ref 0.52–1.04)
GFR SERPL CREATININE-BSD FRML MDRD: NORMAL ML/MIN/1.7M2

## 2017-03-02 ENCOUNTER — OFFICE VISIT (OUTPATIENT)
Dept: FAMILY MEDICINE | Facility: CLINIC | Age: 37
End: 2017-03-02
Payer: MEDICAID

## 2017-03-02 VITALS
SYSTOLIC BLOOD PRESSURE: 88 MMHG | DIASTOLIC BLOOD PRESSURE: 61 MMHG | WEIGHT: 127 LBS | BODY MASS INDEX: 26.66 KG/M2 | OXYGEN SATURATION: 97 % | TEMPERATURE: 97 F | HEIGHT: 58 IN | HEART RATE: 87 BPM

## 2017-03-02 DIAGNOSIS — Z87.19 HISTORY OF HERNIA SURGERY: ICD-10-CM

## 2017-03-02 DIAGNOSIS — Z98.890 HISTORY OF HERNIA SURGERY: ICD-10-CM

## 2017-03-02 DIAGNOSIS — Z98.890 POST-OPERATIVE STATE: Primary | ICD-10-CM

## 2017-03-02 PROCEDURE — T1013 SIGN LANG/ORAL INTERPRETER: HCPCS | Mod: U3 | Performed by: FAMILY MEDICINE

## 2017-03-02 PROCEDURE — 99213 OFFICE O/P EST LOW 20 MIN: CPT | Performed by: FAMILY MEDICINE

## 2017-03-02 NOTE — PROGRESS NOTES
SUBJECTIVE:                                                    Adina Sánchez is a 36 year old female who presents to clinic today for the following health issues:    Hospital Follow-up Visit:    Hospital/Nursing Home/IP Rehab Facility: [facility]   Date of Admission: 17   Date of Discharge: 17   Reason(s) for Admission: hernia surgery             Problems taking medications regularly:  None       Medication changes since discharge: None       Problems adhering to non-medication therapy:  None    Summary of hospitalization:  Discharge summary unavailable  Called patient's surgery clinic to discuss case     Diagnostic Tests/Treatments reviewed.  Follow up needed: with her surgeon  Other Healthcare Providers Involved in Patient s Care:         Surgical follow-up appointment - TBD  Update since discharge: improved.     Post Discharge Medication Reconciliation: discharge medications reconciled, continue medications without change.  Plan of care communicated with patient, family and other healthcare provider        Patient presents for a post-op visit. She was confused, she thought her surgeon told her to follow up with PCP instead.   She has not seen her surgeon yet.   Overall, feels well. Pain controlled. States that she has mesh.     Problem list and histories reviewed & adjusted, as indicated.  Additional history: as documented    Patient Active Problem List   Diagnosis     Pregnancy complicated by fetal lung lesion     Cardiomyopathy (H)     [facility]     Encounter for IUD insertion     Keloid scar     Past Surgical History   Procedure Laterality Date      section         Social History   Substance Use Topics     Smoking status: Never Smoker     Smokeless tobacco: Not on file     Alcohol use Not on file     History reviewed. No pertinent family history.        Reviewed and updated as needed this visit by clinical  "staff  Tobacco  Allergies  Meds  Med Hx  Surg Hx  Fam Hx  Soc Hx      Reviewed and updated as needed this visit by Provider         ROS:  C: NEGATIVE for fever, chills, change in weight  E/M: NEGATIVE for ear, mouth and throat problems  R: NEGATIVE for significant cough or SOB  CV: NEGATIVE for chest pain, palpitations or peripheral edema    OBJECTIVE:                                                    BP (!) 88/61  Pulse 87  Temp 97  F (36.1  C) (Oral)  Ht 4' 10.25\" (1.48 m)  Wt 127 lb (57.6 kg)  LMP 04/10/2016  SpO2 97%  BMI 26.32 kg/m2  Body mass index is 26.32 kg/(m^2).  GENERAL: healthy, alert and no distress  RESP: lungs clear to auscultation - no rales, rhonchi or wheezes  CV: regular rate and rhythm, normal S1 S2, no S3 or S4, no murmur, click or rub, no peripheral edema and peripheral pulses strong  ABDOMEN: bowel sounds normal and well-healed incision superior to umbilicus, incision C/D/I, no dressing in place  BACK: no CVA tenderness, no paralumbar tenderness    Diagnostic Test Results:  none      ASSESSMENT/PLAN:                                                        ICD-10-CM    1. Post-operative state Z98.890    2. History of hernia surgery Z98.890     Z87.19      I called her surgical clinic to discuss plan of care - they need to see her for follow up and apologized for any confusion.   Overall, she is doing well, and pain is controlled.   I am unable to clear her for work as I am not the performing surgeon, and I communicated this to the patient.     See Patient Instructions    Lauren A. Engelmann, MD  [facility]  "

## 2017-03-02 NOTE — NURSING NOTE
"Chief Complaint   Patient presents with     Surgical Followup     surgery 2-21-17      Hospital F/U       Initial BP (!) 88/61  Pulse 87  Temp 97  F (36.1  C) (Oral)  Ht 4' 10.25\" (1.48 m)  Wt 127 lb (57.6 kg)  LMP 04/10/2016  SpO2 97%  BMI 26.32 kg/m2 Estimated body mass index is 26.32 kg/(m^2) as calculated from the following:    Height as of this encounter: 4' 10.25\" (1.48 m).    Weight as of this encounter: 127 lb (57.6 kg).  Medication Reconciliation: complete  Mook Valdez MA    "

## 2017-03-02 NOTE — MR AVS SNAPSHOT
After Visit Summary   3/2/2017    Adina Sánchez    MRN: [id number]           Patient Information     Date Of Birth          1980        Visit Information        Provider Department      3/2/2017 1:30 PM Engelmann, Lauren Anneliese, MD; [employer] [facility]        Today's Diagnoses     Post-operative state    -  1    History of hernia surgery           Follow-ups after your visit        Your next 10 appointments already scheduled     May 03, 2017  8:00 AM CDT   CT CHEST ANGIO W/O & W CONTRAST with UUCT4   [facility] [place], [place] ([facility], [facility])    [address] [address] [phone]           Please bring any scans or X-rays taken at other hospitals, if similar tests were done. Also bring a list of your medicines, including vitamins, minerals and over-the-counter drugs. It is safest to leave personal items at home.  Be sure to tell your doctor:   If you have any allergies.   If there s any chance you are pregnant.   If you are breastfeeding.   If you have any special needs.  You will have contrast for this exam. To prepare:   Do not eat or drink for 2 hours before your exam. If you need to take medicine, you may take it with small sips of water. (We may ask you to take liquid medicine as well.)   The day before your exam, drink extra fluids at least six 8-ounce glasses (unless your doctor tells you to restrict your fluids).  Patients over 70 or patients with diabetes or kidney problems:   If you haven t had a blood test (creatinine test) within the last 30 days, go to your clinic or Diagnostic Imaging Department for this test.  If you have diabetes:   If your kidney function is normal, continue taking your metformin (Avandamet, Glucophage, Glucovance, Metaglip) on the day of your exam.   If your kidney function is abnormal, wait 48 hours before restarting this medicine.  Please wear loose  "clothing, such as a sweat suit or jogging clothes. Avoid snaps, zippers and other metal. We may ask you to undress and put on a hospital gown.  If you have any questions, please call the Imaging Department where you will have your exam.            May 23, 2017  9:00 AM CDT   (Arrive by 8:45 AM)   RETURN CONGENITAL HEART with Amaury Gomez MD   [facility]    [address]  [address] [facility] [address] [phone]              Who to contact     If you have questions or need follow up information about today's clinic visit or your schedule please contact [facility] directly at [phone].  Normal or non-critical lab and imaging results will be communicated to you by MyChart, letter or phone within 4 business days after the clinic has received the results. If you do not hear from us within 7 days, please contact the clinic through MyChart or phone. If you have a critical or abnormal lab result, we will notify you by phone as soon as possible.  Submit refill requests through [employer] or call your pharmacy and they will forward the refill request to us. Please allow 3 business days for your refill to be completed.          Additional Information About Your Visit        Ceradis Information     C[employer]is lets you send messages to your doctor, view your test results, renew your prescriptions, schedule appointments and more. To sign up, go to [email or web address] . Click on \"Log in\" on the left side of the screen, which will take you to the Welcome page. Then click on \"Sign up Now\" on the right side of the page.     You will be asked to enter the access code listed below, as well as some personal information. Please follow the directions to create your username and password.     Your access code is: [email or web address]  Expires: 2017  6:30 AM     Your access code will  in 90 days. If you need help or a new code, please call your " "[facility] or [phone].        Care EveryWhere ID     This is your Care EveryWhere ID. This could be used by other organizations to access your [place] medical records  [id number]        Your Vitals Were     Pulse Temperature Height Last Period Pulse Oximetry BMI (Body Mass Index)    87 97  F (36.1  C) (Oral) 4' 10.25\" (1.48 m) 04/10/2016 97% 26.32 kg/m2       Blood Pressure from Last 3 Encounters:   03/02/17 (!) 88/61   02/17/17 99/66   01/30/17 111/68    Weight from Last 3 Encounters:   03/02/17 127 lb (57.6 kg)   02/17/17 127 lb (57.6 kg)   02/06/17 130 lb (59 kg)              Today, you had the following     No orders found for display       Primary Care Provider Office Phone # Fax #    Lauren Anneliese Engelmann, [phone]       [facility] [address] [facility] [address]        Thank you!     Thank you for choosing [facility]  for your care. Our goal is always to provide you with excellent care. Hearing back from our patients is one way we can continue to improve our services. Please take a few minutes to complete the written survey that you may receive in the mail after your visit with us. Thank you!             Your Updated Medication List - Protect others around you: Learn how to safely use, store and throw away your medicines at www.disposemymeds.org.          This list is accurate as of: 3/2/17 11:59 PM.  Always use your most recent med list.                   Brand Name Dispense Instructions for use    levonorgestrel 20 MCG/24HR IUD    MIRENA    1 each    1 each (20 mcg) by Intrauterine route once for 1 dose       oxyCODONE HCl 5 MG Taba    OXECTA     As needed       Prenatal Vitamin 27-0.8 MG Tabs            "

## 2017-05-04 ENCOUNTER — HOSPITAL ENCOUNTER (OUTPATIENT)
Dept: CT IMAGING | Facility: CLINIC | Age: 37
Discharge: HOME | End: 2017-05-04
Attending: INTERNAL MEDICINE | Admitting: INTERNAL MEDICINE
Payer: COMMERCIAL

## 2017-05-04 DIAGNOSIS — I42.9 CARDIOMYOPATHY, UNSPECIFIED (H): ICD-10-CM

## 2017-05-04 PROCEDURE — 25500064 ZZH RX 255 OP 636: Performed by: INTERNAL MEDICINE

## 2017-05-04 PROCEDURE — 71275 CT ANGIOGRAPHY CHEST: CPT

## 2017-05-04 PROCEDURE — 71275 CT ANGIOGRAPHY CHEST: CPT | Mod: 26 | Performed by: INTERNAL MEDICINE

## 2017-05-04 RX ORDER — IOPAMIDOL 755 MG/ML
100 INJECTION, SOLUTION INTRAVASCULAR ONCE
Status: COMPLETED | OUTPATIENT
Start: 2017-05-04 | End: 2017-05-04

## 2017-05-04 RX ADMIN — IOPAMIDOL 100 ML: 755 INJECTION, SOLUTION INTRAVENOUS at 10:05

## 2017-05-22 ENCOUNTER — PRE VISIT (OUTPATIENT)
Dept: CARDIOLOGY | Facility: CLINIC | Age: 37
End: 2017-05-22

## 2017-05-23 ENCOUNTER — OFFICE VISIT (OUTPATIENT)
Dept: CARDIOLOGY | Facility: CLINIC | Age: 37
End: 2017-05-23
Payer: COMMERCIAL

## 2017-05-23 VITALS
HEART RATE: 90 BPM | BODY MASS INDEX: 24.22 KG/M2 | OXYGEN SATURATION: 98 % | HEIGHT: 62 IN | WEIGHT: 131.6 LBS | DIASTOLIC BLOOD PRESSURE: 72 MMHG | SYSTOLIC BLOOD PRESSURE: 106 MMHG

## 2017-05-23 DIAGNOSIS — Z87.74 S/P PDA REPAIR: Primary | ICD-10-CM

## 2017-05-23 PROCEDURE — 99212 OFFICE O/P EST SF 10 MIN: CPT | Mod: ZF

## 2017-05-23 ASSESSMENT — PAIN SCALES - GENERAL: PAINLEVEL: NO PAIN (0)

## 2017-05-23 NOTE — MR AVS SNAPSHOT
"              After Visit Summary   5/23/2017    Adina Sánchez    MRN: [id number]           Patient Information     Date Of Birth          1980        Visit Information        Provider Department      5/23/2017 8:45 AM Amaury Gomez MD; [facility] Instructions    You were seen today in the Adult Congenital and Cardiovascular Genetics Clinic at the [facility].    Cardiology Providers you saw during your visit:  Dr Amaury Gomez    Diagnosis:   Pt referred with h/o possible cardiomyopathy during pregnancy - however had surgery in [place] at age 11 via left thoracotomy. 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 Now 4 months post partum there is no evidence of pulmonary hypertension, residual ductal shunting or cardiac dysfunction      Results:  Dr Gomez reviewed the results of your CTA of the chest with you in clinic today via a .     Recommendations:    1.  Continue to eat a heart healthy, low salt diet.  2.  Continue to get 20-30 minutes of aerobic activity, 4-5 days per week.  Examples of aerobic activity include walking, running, swimming, cycling, etc.  3.  Continue to observe good oral hygiene, with regular dental visits.  4. Return to clinic sooner if any new symptoms, particularly fainting or significantly decreased exercise tolerance.         Vitals:    05/23/17 0855   BP: 106/72   BP Location: Left arm   Patient Position: Chair   Cuff Size: Adult Regular   Pulse: 90   SpO2: 98%   Weight: 59.7 kg (131 lb 9.6 oz)   Height: 1.575 m (5' 2\")       SBE prophylaxis:   Yes____  No__X__    Lifelong Bacterial Endocarditis Prophylaxis:  YES____  NO__X__    If YES is checked, follow the recommendations outlined below:  1. Take antibiotic(s) prior to interventional procedures or surgeries (dental, respiratory, urologic, gastrointestinal, " gynecologic), or instrumental examinations.   2. Observe good oral hygiene daily, as advised by your dentist. Get regular professional dental care.  3. Keep cuts clean.  4. Infections should be treated promptly.      Exercise restrictions:   Yes____  No__X__         If yes, list restrictions:  Must be allowed to rest if fatigued or SOB      Work restrictions:  Yes____  No__X__         If yes, list restrictions:    FASTING CHOLESTEROL was checked in the last 5 years YES___  NO___   Continue to eat a heart healthy, low salt diet.         ____ Fasting lipid panel order today         ____ No changes in medications          ____ I recommend the following changes in your cholesterol medications.:          __X__ Please follow up for cholesterol screening at your primary care physician  > 6-12 months post partum      Follow-up:   3 years with echo and ECG.     For after hours urgent needs, call [phone] and ask to speak to the Adult Congenital Physician on call.  Mention Job Code 0401.    For emergencies call 911.    For any scheduling needs, please call [facility] , at [phone]  Thank you for your visit today!  If you have questions or concerns about today's visit, please call me.    Qamar Day RN, BSN  Cardiology Care Coordinator  [facility]  [phone]    [address] [place] [address]  Mail Code [id number]  [place] [address]          Follow-ups after your visit        Who to contact     If you have questions or need follow up information about today's clinic visit or your schedule please contact [facility] directly at [phone].  Normal or non-critical lab and imaging results will be communicated to you by MyChart, letter or phone within 4 business days after the clinic has received the results. If you do not hear from us within 7 days, please contact the clinic through MyChart or phone. If you have a critical or abnormal lab result, we will notify  "you by phone as soon as possible.  Submit refill requests through [employer] or call your pharmacy and they will forward the refill request to us. Please allow 3 business days for your refill to be completed.          Additional Information About Your Visit        Dailymotionhart Information     Hstry lets you send messages to your doctor, view your test results, renew your prescriptions, schedule appointments and more. To sign up, go to [email or web address] . Click on \"Log in\" on the left side of the screen, which will take you to the Welcome page. Then click on \"Sign up Now\" on the right side of the page.     You will be asked to enter the access code listed below, as well as some personal information. Please follow the directions to create your username and password.     Your access code is: [email or web address]  Expires: 2017  6:30 AM     Your access code will  in 90 days. If you need help or a new code, please call your [place] clinic or [phone].        Care EveryWhere ID     This is your Care EveryWhere ID. This could be used by other organizations to access your [place] medical records  [id number]        Your Vitals Were     Pulse Height Last Period Pulse Oximetry BMI (Body Mass Index)       90 1.575 m (5' 2\") 04/10/2016 98% 24.07 kg/m2        Blood Pressure from Last 3 Encounters:   17 106/72   17 (!) 88/61   17 99/66    Weight from Last 3 Encounters:   17 59.7 kg (131 lb 9.6 oz)   17 57.6 kg (127 lb)   17 57.6 kg (127 lb)              Today, you had the following     No orders found for display       Primary Care Provider Office Phone # Fax #    Lauren Anneliese Engelmann, [phone]       [address] [address]        Thank you!     Thank you for choosing [facility]  for your care. Our goal is always to provide you with excellent care. Hearing back from our patients is one way we " can continue to improve our services. Please take a few minutes to complete the written survey that you may receive in the mail after your visit with us. Thank you!             Your Updated Medication List - Protect others around you: Learn how to safely use, store and throw away your medicines at www.disposemymeds.org.          This list is accurate as of: 5/23/17 10:15 AM.  Always use your most recent med list.                   Brand Name Dispense Instructions for use    levonorgestrel 20 MCG/24HR IUD    MIRENA    1 each    1 each (20 mcg) by Intrauterine route once for 1 dose       oxyCODONE HCl 5 MG Taba    OXECTA     Reported on 5/23/2017       Prenatal Vitamin 27-0.8 MG Tabs

## 2017-05-23 NOTE — PROGRESS NOTES
Adult Congenital Cardiology Visit    Patient:  Adina Sánchez MRN:  [id number]   YOB: 1980 Age:  36 year old   Date of Visit:  May 23, 2017 PCP:  Bryon Love Md     Dear Care Provider.     I had the pleasure of seeing your patient Adina Sánchez at the [facility] Cong[facility]iology [facility] on May 23, 2017.   Adina is a 36 year old female who was [facility] in [facility]. At 11 y of age, she underwent cardiac surgery via a left lateral thoracotomy due to excessive tiredness and shortness of breath. We have no records of this event and Adina  is not sure about the cardiac diagnosis.  She remembers that she spent 1 week in the hospital at that time.Since then she has been doing well. She has a large keloid and has attempted steroid treatment with no improvement. I suggested she see plastic surgery and they provided a consult that is documented in the chart. She is currently 4 months postpartum after her second child and is here for a follow up visit and CTA. Her pregnancy and delivery went smoothly. She is currently on prenatal vitamins. No history of allergies.    She is has had intermittent chest pain in the past, but currently asymptomatic- no chest pain, no shortness of breath, no orthopnea, no cyanosis, no passing out, no palpitations. SHe is here with her  who is doing well.       Past medical history:  Left lateral thoracotomy for a cardiac surgery at age 11.    She has a large keloid at surgical site which has been treated with steroids, but is irritating and often bleeds.   She has a current medication list which includes the following prescription(s): prenatal vitamin, oxycodone hcl, and levonorgestrel. Shehas No Known Allergies.    Family History: The family history was reviewed and updated today.   - Mother had diabetes  - Has a ~5 y old son who was noted to have a small VSD when he was 2 y old, currently being followed by [facility]  "pediatric cardiologists.  has not yet had echocardiogram. Fetal echo was normal.     Social history:  Lives with , 5 y old son and  son.     Review of Systems: A comprehensive review of systems was performed and is negative, except as noted in the HPI and PMH    Physical exam:  Her height is 1.575 m (5' 2\") and weight is 59.7 kg (131 lb 9.6 oz). Her blood pressure is 106/72 and her pulse is 90. Her oxygen saturation is 98%.   Her body mass index is 24.07 kg/(m^2).  Her body surface area is 1.62 meters squared.  Adina is a 36 year old female in no distress. There is no central or peripheral cyanosis. Pupils are reactive and sclera are not jaundiced. There is no conjunctival injection or discharge. EOMI. Mucous membranes are moist and pink. There is left posterior lateral thoracotomy scar ~ 15 cm long with significant raised keloid formation. Lungs are clear to ausculation bilaterally with no wheezes, rales or rhonchi. There is no increased work of breathing, retractions or nasal flaring. Precordium is quiet with a normally placed apical impulse. On auscultation, heart sounds are regular with normal S1 and physiologically split S2. There are no murmurs, rubs or gallops. Abdomen is soft and non-tender without masses or hepatomegaly. Femoral pulses are normal with no brachial femoral delay. Skin is without rashes, lesions, or significant bruising. Extremities are warm and well-perfused with no cyanosis, clubbing or edema. Peripheral pulses are normal and there is < 2 sec capillary refill. Patient is alert and oriented and moves all extremities equally with normal tone.     12 Lead EKG was performed last visit and shows normal sinus rhythm at a rate of 93 bpm with normal intervals and no chamber enlargement or hypertrophy.    An echocardiogram performed last visit is normal with no shunts, decreased function or chamber enlargement. No vascular anomalies noted. :  Left ventricular size is normal. " Global and regional left ventricular function is normal with an EF of 55-60%. The right ventricle is normal size. Global right ventricular function is normal. The inferior vena cava is normal. No pericardial effusion is present. Previous study not available for comparison.    CTA: FINDINGS:      1. The aortic root is normal in size. The ascending aorta, arch, and  descending aorta are normal in diameter. There is mild bulging of the  proximal descending thoracic aorta, likely at site of prior aortic  repair. This likely represents prior PDA repair.  2. The aortic arch is left sided. There is normal branching of the  arch vessels. There is no coarctation seen.  3. The main and proximal branch pulmonary arteries are normal in size.     4. The systemic venous connections are normal.   5. The cardiac chambers demonstrate normal atrioventricular and  ventriculoarterial concordance.  6. Coronary arteries originate from their respective cusps.  7. The pericardium is unremarkable.  There is no pericardial effusion.       In summary, Adina is a 36 year old with past history of cardiac surgery via left thoracotomy at age 11 years of age due to fatigue and SOB (likely secondary to repair of PDA based on CTA results and history.  She is 4 months post partum after a normal delivery of her second child.  Adina is asymptomatic from a cardiac standpoint.      Thank you for the opportunity to participate in Adina's care.  I did not recommend any activity restrictions or endocarditis prophylaxis.  I asked to see her back in 3 years with a repeat echocardiogram to check PA pressures and an ECG. It would be reasonable for her  son to have a  echocardiogram to r/o PDA or other congenital cardiac disease.  Please do not hesitate to call with questions or concerns.    Diagnoses:   1. S/P thoracotomy at age 11- likely late repair of  Symptomatic PDA   2. Normal echocardiogram, Asymptomatic, normal BP's - no evidence  of intracardiac shunting, elevated PA pressure or ventricular dysfunction  3. Significant keloid formation at thoracotomy scar  4.  child with h/o fetal lung mass - doing well  5. 5 y old son with history of VSD - asymtomatic    Amaury Gomez M.D.    [phone]   of Pediatrics  Pediatric and Adult Congenital Cardiology  [facility]  Adult Congenital and Cardiovascular Genetics Center      CC:  Family of Adina Solorio Gonzalo

## 2017-05-23 NOTE — NURSING NOTE
Chief Complaint   Patient presents with     Follow Up For     heart problem--36 year old female with history of cardiomyopathy, approx 4 months post partum presenting for follow up

## 2017-05-23 NOTE — NURSING NOTE
Cardiac Testing: Patient given instructions regarding  echocardiogram and EKG. Discussed purpose, preparation, procedure and when to expect results reported back to the patient. Patient demonstrated understanding of this information and agreed to call with further questions or concerns.    Med Reconcile: Reviewed and verified all current medications with the patient. The updated medication list was printed and given to the patient.    Return Appointment: Follow up with Dr Gomez in 3 years with an echocardiogram and EKG. Patient given instructions regarding scheduling next clinic visit. Patient demonstrated understanding of this information and agreed to call with further questions or concerns.    Patient stated she understood all health information given and agreed to call with further questions or concerns.    Qamar Day, RN  RN Care Coordinator  [facility]  [phone]

## 2017-05-23 NOTE — LETTER
5/23/2017      RE: Adina Sánchez  Formerly [facility] [address]       Dear Colleague,    Thank you for the opportunity to participate in the care of your patient, Adina Sánchez, at the [facility] HEART [facility] at [facility]. Please see a copy of my visit note below.    Adult Congenital Cardiology Visit    Patient:  Adina Sánchez MRN:  [id number]   YOB: 1980 Age:  36 year old   Date of Visit:  May 23, 2017 PCP:  Bryon Love Md     Dear Care Provider.     I had the pleasure of seeing your patient Adina Sánchez at the [facility] on May 23, 2017.   Adina is a 36 year old female who was born in [facility]. At 11 y of age, she underwent cardiac surgery via a left lateral thoracotomy due to excessive tiredness and shortness of breath. We have no records and she is not sure about the cardiac diagnosis. ? PDA closure vs Coarctation of aorta Vs vascular ring resection/repair. She spent 1 week in the hospital at that time.Since then she has been doing well. She is currently 4 months postpartum after her second child. She is currently on prenatal vitamins. No history of allergies.    She is has had intermittent chest pain in the past, but currently asymptomatic- no chest pain, no shortness of breath, no orthopnea, no cyanosis, no passing out, no palpitations.     With regard to her pregnancy, the fetus is diagnosed with a left lung mass, recently underwent a fetal echocardiogram on 10/12/16 ( 27 week gestation) and noted to have normal intracardiac anatomy, no compression of the ventricles, but the heart is shifted to the right side of the lung, otherwise good biventricular function. Dr Carey is following. She had no complications with her last pregnancy.     Past medical history:  The past medical history was reviewed with the patient and family today and updated. Left lateral  "thoracotomy for a cardiac surgery at age 11. Diagnosis unknown. She has a large keloid at surgical site which has been treated with steroids, but is irritating and often bleeds. .  She has a current medication list which includes the following prescription(s): prenatal vitamin, oxycodone hcl, and levonorgestrel. Shehas No Known Allergies.    Family History: The family history was reviewed and updated today.   - Mother had diabetes  - Has a ~5 y old son who was noted to have a small VSD when he was 2 y old, currently being followed by [facility] pediatric cardiologists.     Social history:  Lives with , 5 y old son and  son.     Review of Systems: A comprehensive review of systems was performed and is negative, except as noted in the HPI and PMH    Physical exam:  Her height is 1.575 m (5' 2\") and weight is 59.7 kg (131 lb 9.6 oz). Her blood pressure is 106/72 and her pulse is 90. Her oxygen saturation is 98%.   Her body mass index is 24.07 kg/(m^2).  Her body surface area is 1.62 meters squared.  Adina is a 36 year old female in no distress. There is no central or peripheral cyanosis. Pupils are reactive and sclera are not jaundiced. There is no conjunctival injection or discharge. EOMI. Mucous membranes are moist and pink. There is left posterior lateral thoracotomy scar ~ 15 cm long with significant raised keloid formation. Lungs are clear to ausculation bilaterally with no wheezes, rales or rhonchi. There is no increased work of breathing, retractions or nasal flaring. Precordium is quiet with a normally placed apical impulse. On auscultation, heart sounds are regular with normal S1 and physiologically split S2. There are no murmurs, rubs or gallops. Abdomen is soft and non-tender without masses or hepatomegaly. Femoral pulses are normal with no brachial femoral delay. Skin is without rashes, lesions, or significant bruising. Extremities are warm and well-perfused with no " cyanosis, clubbing or edema. Peripheral pulses are normal and there is < 2 sec capillary refill. Patient is alert and oriented and moves all extremities equally with normal tone.     12 Lead EKG was performed last visit and shows normal sinus rhythm at a rate of 93 bpm with normal intervals and no chamber enlargement or hypertrophy.    An echocardiogram performed last visit is normal with no shunts, decreased function or chamber enlargement. No vascular anomalies noted. :  Left ventricular size is normal. Global and regional left ventricular function is normal with an EF of 55-60%. The right ventricle is normal size. Global right ventricular function is normal. The inferior vena cava is normal. No pericardial effusion is present. Previous study not available for comparison.    CTA: FINDINGS:      1. The aortic root is normal in size. The ascending aorta, arch, and  descending aorta are normal in diameter. There is mild bulging of the  proximal descending thoracic aorta, likely at site of prior aortic  repair. This likely represents prior PDA repair.  2. The aortic arch is left sided. There is normal branching of the  arch vessels. There is no coarctation seen.  3. The main and proximal branch pulmonary arteries are normal in size.     4. The systemic venous connections are normal.   5. The cardiac chambers demonstrate normal atrioventricular and  ventriculoarterial concordance.  6. Coronary arteries originate from their respective cusps.  7. The pericardium is unremarkable.  There is no pericardial effusion.       In summary, Adina is a 36 year old with past history of cardiac surgery via left thoracotomy at age 11 years of age due to fatigue and SOB (likely secondary to repair of PDA based on CTA results and history.  She is 4 months post partum after a normal delivery of her second child.  Adina is asymptomatic from a cardiac standpoint.      Thank you for the opportunity to participate in Adina's care.  I  did not recommend any activity restrictions or endocarditis prophylaxis.  I asked to see her back in 6 months post delivery with CT angiogram. Please do not hesitate to call with questions or concerns.    Diagnoses:   1. S/P repair of  PDA age 11  2. Normal echocardiogram, Asymptomatic, normal BP's - no evidence of intracardiac shunting, elevated PA pressure or ventricular dysfunction  3. Significant keloid formation at thoracotomy scar  4.  child with h/o fetal lung mass - doing well  5. 5 y old son with history of VSD - asymtomatic    Amaury Gomez M.D.    [phone]   of Pediatrics  Pediatric and Adult Congenital Cardiology  [facility]  [facility]      CC:  Family of Adina Sánchez  [address] [facility] [address]

## 2017-10-27 ENCOUNTER — ALLIED HEALTH/NURSE VISIT (OUTPATIENT)
Dept: NURSING | Facility: CLINIC | Age: 37
End: 2017-10-27
Payer: COMMERCIAL

## 2017-10-27 DIAGNOSIS — Z23 NEED FOR PROPHYLACTIC VACCINATION AND INOCULATION AGAINST INFLUENZA: Primary | ICD-10-CM

## 2017-10-27 PROCEDURE — 99207 ZZC NO CHARGE NURSE ONLY: CPT

## 2017-10-27 PROCEDURE — 90471 IMMUNIZATION ADMIN: CPT

## 2017-10-27 PROCEDURE — 90686 IIV4 VACC NO PRSV 0.5 ML IM: CPT

## 2017-10-27 NOTE — NURSING NOTE
Prior to injection verified patient identity using patient's name and date of birth.  KHOI Pal MA    Per orders of Dr. Hernandez, injection of Influenza given by Azul Pal. Patient instructed to remain in clinic for 15 minutes afterwards, and to report any adverse reaction to me immediately.    VIS for Influenza given on same date of administration.  Staff signature/Title: KHOI Pal MA

## 2017-10-27 NOTE — PROGRESS NOTES

## 2017-10-27 NOTE — MR AVS SNAPSHOT
"              After Visit Summary   10/27/2017    Adina Sánchez    MRN: [id number]           Patient Information     Date Of Birth          1980        Visit Information        Provider Department      10/27/2017 8:10 AM CARE COORDINATOR NAT [facility]        Today's Diagnoses     Need for prophylactic vaccination and inoculation against influenza    -  1       Follow-ups after your visit        Who to contact     If you have questions or need follow up information about today's clinic visit or your schedule please contact [facility] directly at [phone].  Normal or non-critical lab and imaging results will be communicated to you by VCNChart, letter or phone within 4 business days after the clinic has received the results. If you do not hear from us within 7 days, please contact the clinic through [employer] or phone. If you have a critical or abnormal lab result, we will notify you by phone as soon as possible.  Submit refill requests through [employer] or call your pharmacy and they will forward the refill request to us. Please allow 3 business days for your refill to be completed.          Additional Information About Your Visit        MyChart Information     [employer] lets you send messages to your doctor, view your test results, renew your prescriptions, schedule appointments and more. To sign up, go to [email or web address] . Click on \"Log in\" on the left side of the screen, which will take you to the Welcome page. Then click on \"Sign up Now\" on the right side of the page.     You will be asked to enter the access code listed below, as well as some personal information. Please follow the directions to create your username and password.     Your access code is: [email or web address]  Expires: 2018  8:42 AM     Your access code will  in 90 days. If you need help or a new code, please call your [facility]) or [phone].        Care EveryWhere ID     " This is your Care EveryWhere ID. This could be used by other organizations to access your [facility] medical records  [id number]         Blood Pressure from Last 3 Encounters:   05/23/17 106/72   03/02/17 (!) 88/61   02/17/17 99/66    Weight from Last 3 Encounters:   05/23/17 131 lb 9.6 oz (59.7 kg)   03/02/17 127 lb (57.6 kg)   02/17/17 127 lb (57.6 kg)              We Performed the Following     FLU VAC, SPLIT VIRUS IM > 3 YO (QUADRIVALENT) [80645]     Vaccine Administration, Initial [87179]        Primary Care Provider Office Phone # Fax #    Lauren Anneliese Engelmann, [phone]       [facility] [address]  [facility] [address]        Equal Access to Services     ROSY RAMOS : Hadii aad ku hadasho Soomaali, waaxda luqadaha, qaybta kaalmada adeegyada, martin tannerin hayaan caitlin gutierrez . So [facility] [phone].    ATENCIÓN: Si habla español, tiene a garcia disposición servicios gratuitos de asistencia lingüística. Llame al [phone].    We comply with applicable federal civil rights laws and [place] laws. We do not discriminate on the basis of race, color, national origin, age, disability, sex, sexual orientation, or gender identity.            Thank you!     Thank you for choosing [facility]  for your care. Our goal is always to provide you with excellent care. Hearing back from our patients is one way we can continue to improve our services. Please take a few minutes to complete the written survey that you may receive in the mail after your visit with us. Thank you!             Your Updated Medication List - Protect others around you: Learn how to safely use, store and throw away your medicines at www.disposemymeds.org.          This list is accurate as of: 10/27/17  8:42 AM.  Always use your most recent med list.                   Brand Name Dispense Instructions for use Diagnosis    levonorgestrel 20 MCG/24HR IUD    MIRENA     1 each    1 each (20 mcg) by Intrauterine route once for 1 dose    Encounter for IUD insertion       oxyCODONE HCl 5 MG Taba    OXECTA     Reported on 5/23/2017        Prenatal Vitamin 27-0.8 MG Tabs

## 2017-10-30 ENCOUNTER — OFFICE VISIT (OUTPATIENT)
Dept: FAMILY MEDICINE | Facility: CLINIC | Age: 37
End: 2017-10-30
Payer: COMMERCIAL

## 2017-10-30 VITALS
DIASTOLIC BLOOD PRESSURE: 67 MMHG | HEART RATE: 90 BPM | WEIGHT: 127 LBS | SYSTOLIC BLOOD PRESSURE: 105 MMHG | BODY MASS INDEX: 23.23 KG/M2 | TEMPERATURE: 97 F | OXYGEN SATURATION: 97 %

## 2017-10-30 DIAGNOSIS — N92.1 MENORRHAGIA WITH IRREGULAR CYCLE: ICD-10-CM

## 2017-10-30 DIAGNOSIS — H66.011 ACUTE SUPPURATIVE OTITIS MEDIA OF RIGHT EAR WITH SPONTANEOUS RUPTURE OF TYMPANIC MEMBRANE, RECURRENCE NOT SPECIFIED: Primary | ICD-10-CM

## 2017-10-30 PROCEDURE — 99214 OFFICE O/P EST MOD 30 MIN: CPT | Performed by: FAMILY MEDICINE

## 2017-10-30 ASSESSMENT — PAIN SCALES - GENERAL: PAINLEVEL: SEVERE PAIN (7)

## 2017-10-30 NOTE — NURSING NOTE
"Chief Complaint   Patient presents with     Ear Problem     Abnormal Bleeding Problem       Initial /67 (BP Location: Right arm, Patient Position: Chair, Cuff Size: Adult Regular)  Pulse 90  Temp 97  F (36.1  C) (Oral)  Wt 127 lb (57.6 kg)  LMP 09/02/2017  SpO2 97%  BMI 23.23 kg/m2 Estimated body mass index is 23.23 kg/(m^2) as calculated from the following:    Height as of 5/23/17: 5' 2\" (1.575 m).    Weight as of this encounter: 127 lb (57.6 kg).  Medication Reconciliation: complete     Trudi Spencer MA        "

## 2017-10-30 NOTE — PROGRESS NOTES
SUBJECTIVE:   Adina Sánchez is a 37 year old female who presents to clinic today for the following health issues:    Ear problem      Duration: 3 weeks ago had URI, then had ear pain a week later. Had bout of intense pain and then purulent drainage.     Description (location/character/radiation): right ear    Intensity:  Mild to moderate    Accompanying signs and symptoms: Pus coming out of ear, painful    History (similar episodes/previous evaluation): When she was younger she had this too.    Precipitating or alleviating factors: None    Therapies tried and outcome: None       Vaginal Bleeding  Patient wants to discuss irregular bleeding since IUD was inserted - no pain or increased cramping, just irregular cycles. Does not feel the IUD coming out in to vagina.     Problem list and histories reviewed & adjusted, as indicated.  Additional history: as documented    Patient Active Problem List   Diagnosis     Pregnancy complicated by fetal lung lesion     Cardiomyopathy (H)     Patient is followed by the [facility]     Encounter for IUD insertion     Keloid scar     Past Surgical History:   Procedure Laterality Date      SECTION         Social History   Substance Use Topics     Smoking status: Never Smoker     Smokeless tobacco: Not on file     Alcohol use Not on file     History reviewed. No pertinent family history.          Reviewed and updated as needed this visit by clinical staff     Reviewed and updated as needed this visit by Provider         ROS:  Constitutional, HEENT, cardiovascular, pulmonary, gi and gu systems are negative, except as otherwise noted.      OBJECTIVE:   /67 (BP Location: Right arm, Patient Position: Chair, Cuff Size: Adult Regular)  Pulse 90  Temp 97  F (36.1  C) (Oral)  Wt 127 lb (57.6 kg)  LMP 2017  SpO2 97%  BMI 23.23 kg/m2  Body mass index is 23.23 kg/(m^2).  GENERAL: healthy, alert and no distress  HENT:  normal cephalic/atraumatic, right ear: erythematous, bulging membrane and purulent drainage in canal, left ear: clear effusion, nose and mouth without ulcers or lesions, oropharynx clear and oral mucous membranes moist  NECK: no adenopathy, no asymmetry, masses, or scars and thyroid normal to palpation  RESP: lungs clear to auscultation - no rales, rhonchi or wheezes  CV: regular rate and rhythm, normal S1 S2, no S3 or S4, no murmur, click or rub, no peripheral edema and peripheral pulses strong  ABDOMEN: soft, nontender, no hepatosplenomegaly, no masses and bowel sounds normal  BACK: no CVA tenderness, no paralumbar tenderness    Diagnostic Test Results:  none     ASSESSMENT/PLAN:       ICD-10-CM    1. Acute suppurative otitis media of right ear with spontaneous rupture of tympanic membrane, recurrence not specified H66.011 amoxicillin-clavulanate (AUGMENTIN) 875-125 MG per tablet   2. Menorrhagia with irregular cycle N92.1      Will treat for ongoing otitis media given exam. Patient prefers oral treatment to drops.   Counseled extensively on IUD bleeding patterns including irregular cycle, spotting, and changes to flow. Advised to return to clinic if she has increased pain, sensation that the device has moved, purulent drainage, or any other issue. She verbalized understanding.     See Patient Instructions    Lauren A. Engelmann, MD  [facility]

## 2017-10-30 NOTE — MR AVS SNAPSHOT
After Visit Summary   10/30/2017    Adina Sánchez    MRN: [id number]           Patient Information     Date Of Birth          1980        Visit Information        Provider Department      10/30/2017 10:20 AM Engelmann, Lauren Anneliese, MD; MULTILINGUAL WORD [facility]        Today's Diagnoses     Acute suppurative otitis media of right ear with spontaneous rupture of tympanic membrane, recurrence not specified    -  1    Menorrhagia with irregular cycle          Care Instructions      Rotura del tímpano, infección (Adulto)    El oído medio es el espacio que está detrás del tímpano. Usted tiene samira infección en el oído medio. [place] puede causar samira presión que lleva a que se rompa el tímpano (ruptura). [place] puede causar dolor súbito. Saldrá pus o vilma del canal auditivo. También es probable que garcia audición se nae afectada.  La infección se puede tratar con antibióticos. El tímpano suele sanar completamente por sí mismo. Si no es así, se necesita tratamiento adicional. Por esta razón, es importante que petra samira visita de control a un otorrinolaringólogo (especialista de oídos, nariz y garganta).  Cuidados en la casa    [place] todos los antibióticos hasta terminarlos, aun cuando se sienta mejor después de los primeros días.    [place] otros medicamentos que probablemente le dieron seún las indicaciones.    Mantenga samira jennifer de algodón limpio en el canal auditivo para absorber el líquido que sale. Cambie el algodón a menudo, cuando se ensucie con janell líquido. No permita que entre agua en el oído. No coloque ningún medicamento en gotas en el oído a menos que se lo haya indicado garcia proveedor de atención médica.  Vistas de control  Programe samira visita de seguimiento con garcia proveedor de atención médica en las próximas dos semanas o según le hayan aconsejado. [place] es para asegurarse de que la infección esté mejorando y de que el tímpano esté sanando. Además petra un seguimiento  "con especialistas según le recomendaron para que le jose ramon samira prueba o examen de la audición.   Cuándo debe buscar atención médica?  Llame a garcia proveedor de atención médica si presenta alguno de los siguientes síntomas:    Fiebre de 100.4  F (38.0 C) o más    Somnolencia o confusión fuera de lo común    Dolor de radha o de gianfranco, o rigidez en el gianfranco    Convulsiones    Empeoramiento del mareo    Empeoramiento de la pérdida de la audición o el zumbido en el oído  Date Last Reviewed: 5/3/2015    [id number] [employer]. [address] [place] [address]. Todos los derechos reservados. Esta información no pretende sustituir la atención médica profesional. Sólo garcia médico puede diagnosticar y tratar un problema de maria isabel.                Follow-ups after your visit        Who to contact     If you have questions or need follow up information about today's clinic visit or your schedule please contact [facility] directly at [phone].  Normal or non-critical lab and imaging results will be communicated to you by MyChart, letter or phone within 4 business days after the clinic has received the results. If you do not hear from us within 7 days, please contact the clinic through [employer] or phone. If you have a critical or abnormal lab result, we will notify you by phone as soon as possible.  Submit refill requests through [employer] or call your pharmacy and they will forward the refill request to us. Please allow 3 business days for your refill to be completed.          Additional Information About Your Visit        [employer] Information     [employer] lets you send messages to your doctor, view your test results, renew your prescriptions, schedule appointments and more. To sign up, go to [email or web address] . Click on \"Log in\" on the left side of the screen, which will take you to the Welcome page. Then click on \"Sign up Now\" on the right side of the page.     You will be asked " to enter the access code listed below, as well as some personal information. Please follow the directions to create your username and password.     Your access code is: [email or web address]  Expires: 2018  8:42 AM     Your access code will  in 90 days. If you need help or a new code, please call your [place] clinic or [phone].        Care EveryWhere ID     This is your Care EveryWhere ID. This could be used by other organizations to access your [place] medical records  [id number]        Your Vitals Were     Pulse Temperature Last Period Pulse Oximetry BMI (Body Mass Index)       90 97  F (36.1  C) (Oral) 2017 97% 23.23 kg/m2        Blood Pressure from Last 3 Encounters:   10/30/17 105/67   17 106/72   17 (!) 88/61    Weight from Last 3 Encounters:   10/30/17 127 lb (57.6 kg)   17 131 lb 9.6 oz (59.7 kg)   17 127 lb (57.6 kg)              Today, you had the following     No orders found for display         Today's Medication Changes          These changes are accurate as of: 10/30/17 10:54 AM.  If you have any questions, ask your nurse or doctor.               Start taking these medicines.        Dose/Directions    amoxicillin-clavulanate 875-125 MG per tablet   Commonly known as:  AUGMENTIN   Used for:  Acute suppurative otitis media of right ear with spontaneous rupture of tympanic membrane, recurrence not specified   Started by:  Engelmann, Lauren Anneliese, MD        Dose:  1 tablet   Take 1 tablet by mouth 2 times daily   Quantity:  20 tablet   Refills:  0            Where to get your medicines      These medications were sent to [facility] - [place], [place] - [address]  [address], [place]. \[facility].\"" [address]     Phone:  [phone]     amoxicillin-clavulanate 875-125 MG per tablet                Primary Care Provider Office Phone # Fax #    Lauren Anneliese Engelmann, [phone]       [facility]  [facility] [address]  [facility] [address]        Equal Access to Services     ROSY RAMOS : Hadii aad ku hadchiomatejal Somatildeali, waaxda luqadaha, qaybta kaalmada tika, martin conroynomidilan arce. So [facility] [phone].    ATENCIÓN: Si habla español, tiene a garcia disposición servicios gratuitos de asistencia lingüística. Llame al [phone].    We comply with applicable federal civil rights laws and [place] laws. We do not discriminate on the basis of race, color, national origin, age, disability, sex, sexual orientation, or gender identity.            Thank you!     Thank you for choosing [facility]  for your care. Our goal is always to provide you with excellent care. Hearing back from our patients is one way we can continue to improve our services. Please take a few minutes to complete the written survey that you may receive in the mail after your visit with us. Thank you!             Your Updated Medication List - Protect others around you: Learn how to safely use, store and throw away your medicines at www.disposemymeds.org.          This list is accurate as of: 10/30/17 10:54 AM.  Always use your most recent med list.                   Brand Name Dispense Instructions for use Diagnosis    amoxicillin-clavulanate 875-125 MG per tablet    AUGMENTIN    20 tablet    Take 1 tablet by mouth 2 times daily    Acute suppurative otitis media of right ear with spontaneous rupture of tympanic membrane, recurrence not specified       levonorgestrel 20 MCG/24HR IUD    MIRENA    1 each    1 each (20 mcg) by Intrauterine route once for 1 dose    Encounter for IUD insertion       oxyCODONE HCl 5 MG Taba    OXECTA     Reported on 5/23/2017        Prenatal Vitamin 27-0.8 MG Tabs

## 2017-10-30 NOTE — PATIENT INSTRUCTIONS
Rotura del tímpano, infección (Adulto)    El oído medio es el espacio que está detrás del tímpano. Usted tiene samira infección en el oído medio. Nectar puede causar samira presión que lleva a que se rompa el tímpano (ruptura). Nectar puede causar dolor súbito. Saldrá pus o vilma del canal auditivo. También es probable que garcia audición se nae afectada.  La infección se puede tratar con antibióticos. El tímpano suele sanar completamente por sí mismo. Si no es así, se necesita tratamiento adicional. Por esta razón, es importante que petra samira visita de control a un otorrinolaringólogo (especialista de oídos, nariz y garganta).  Cuidados en la casa    [place] todos los antibióticos hasta terminarlos, aun cuando se sienta mejor después de los primeros días.    [place] otros medicamentos que probablemente le dieron seún las indicaciones.    Mantenga samira jennifer de algodón limpio en el canal auditivo para absorber el líquido que sale. Cambie el algodón a menudo, cuando se ensucie con janell líquido. No permita que entre agua en el oído. No coloque ningún medicamento en gotas en el oído a menos que se lo haya indicado garcia proveedor de atención médica.  Vistas de control  Programe samira visita de seguimiento con garcia proveedor de atención médica en las próximas dos semanas o según le hayan aconsejado. Nectar es para asegurarse de que la infección esté mejorando y de que el tímpano esté sanando. Además petra un seguimiento con especialistas según le recomendaron para que le jose ramon samira prueba o examen de la audición.   Cuándo debe buscar atención médica?  Llame a garcia proveedor de atención médica si presenta alguno de los siguientes síntomas:    Fiebre de 100.4  F (38.0 C) o más    Somnolencia o confusión fuera de lo común    Dolor de radha o de gianfranco, o rigidez en el gianfranco    Convulsiones    Empeoramiento del mareo    Empeoramiento de la pérdida de la audición o el zumbido en el oído  Date Last Reviewed: 5/3/2015    [id number] [employer]  [address] [facility], [place] [address]. Todos los derechos reservados. Esta información no pretende sustituir la atención médica profesional. Sólo garcia médico puede diagnosticar y tratar un problema de maria isabel.

## 2018-03-07 ENCOUNTER — TELEPHONE (OUTPATIENT)
Dept: FAMILY MEDICINE | Facility: CLINIC | Age: 38
End: 2018-03-07

## 2018-03-07 ENCOUNTER — OFFICE VISIT (OUTPATIENT)
Dept: FAMILY MEDICINE | Facility: CLINIC | Age: 38
End: 2018-03-07
Payer: COMMERCIAL

## 2018-03-07 VITALS
HEART RATE: 81 BPM | WEIGHT: 132 LBS | OXYGEN SATURATION: 97 % | TEMPERATURE: 97.4 F | BODY MASS INDEX: 24.14 KG/M2 | DIASTOLIC BLOOD PRESSURE: 64 MMHG | SYSTOLIC BLOOD PRESSURE: 106 MMHG

## 2018-03-07 DIAGNOSIS — Z13.220 SCREENING FOR LIPID DISORDERS: ICD-10-CM

## 2018-03-07 DIAGNOSIS — Z13.21 ENCOUNTER FOR VITAMIN DEFICIENCY SCREENING: ICD-10-CM

## 2018-03-07 DIAGNOSIS — R63.5 WEIGHT GAIN: ICD-10-CM

## 2018-03-07 DIAGNOSIS — Z00.00 WELL WOMAN EXAM (NO GYNECOLOGICAL EXAM): Primary | ICD-10-CM

## 2018-03-07 DIAGNOSIS — Z13.1 SCREENING FOR DIABETES MELLITUS: ICD-10-CM

## 2018-03-07 DIAGNOSIS — L30.8 OTHER ECZEMA: ICD-10-CM

## 2018-03-07 LAB
ANION GAP SERPL CALCULATED.3IONS-SCNC: 7 MMOL/L (ref 3–14)
BASOPHILS # BLD AUTO: 0 10E9/L (ref 0–0.2)
BASOPHILS NFR BLD AUTO: 0 %
BETA HCG QUAL IFA URINE: NEGATIVE
BUN SERPL-MCNC: 14 MG/DL (ref 7–30)
CALCIUM SERPL-MCNC: 8.4 MG/DL (ref 8.5–10.1)
CHLORIDE SERPL-SCNC: 108 MMOL/L (ref 94–109)
CHOLEST SERPL-MCNC: 155 MG/DL
CO2 SERPL-SCNC: 25 MMOL/L (ref 20–32)
CREAT SERPL-MCNC: 0.46 MG/DL (ref 0.52–1.04)
DIFFERENTIAL METHOD BLD: NORMAL
EOSINOPHIL # BLD AUTO: 0.2 10E9/L (ref 0–0.7)
EOSINOPHIL NFR BLD AUTO: 3.4 %
ERYTHROCYTE [DISTWIDTH] IN BLOOD BY AUTOMATED COUNT: 12.9 % (ref 10–15)
GFR SERPL CREATININE-BSD FRML MDRD: >90 ML/MIN/1.7M2
GLUCOSE SERPL-MCNC: 77 MG/DL (ref 70–99)
HCT VFR BLD AUTO: 42.2 % (ref 35–47)
HDLC SERPL-MCNC: 48 MG/DL
HGB BLD-MCNC: 14.2 G/DL (ref 11.7–15.7)
LDLC SERPL CALC-MCNC: 90 MG/DL
LYMPHOCYTES # BLD AUTO: 1.6 10E9/L (ref 0.8–5.3)
LYMPHOCYTES NFR BLD AUTO: 34 %
MCH RBC QN AUTO: 29.5 PG (ref 26.5–33)
MCHC RBC AUTO-ENTMCNC: 33.6 G/DL (ref 31.5–36.5)
MCV RBC AUTO: 88 FL (ref 78–100)
MONOCYTES # BLD AUTO: 0.3 10E9/L (ref 0–1.3)
MONOCYTES NFR BLD AUTO: 7.2 %
NEUTROPHILS # BLD AUTO: 2.6 10E9/L (ref 1.6–8.3)
NEUTROPHILS NFR BLD AUTO: 55.4 %
NONHDLC SERPL-MCNC: 107 MG/DL
PLATELET # BLD AUTO: 214 10E9/L (ref 150–450)
POTASSIUM SERPL-SCNC: 4.2 MMOL/L (ref 3.4–5.3)
RBC # BLD AUTO: 4.82 10E12/L (ref 3.8–5.2)
SODIUM SERPL-SCNC: 140 MMOL/L (ref 133–144)
TRIGL SERPL-MCNC: 83 MG/DL
WBC # BLD AUTO: 4.7 10E9/L (ref 4–11)

## 2018-03-07 PROCEDURE — 85025 COMPLETE CBC W/AUTO DIFF WBC: CPT | Performed by: FAMILY MEDICINE

## 2018-03-07 PROCEDURE — 80061 LIPID PANEL: CPT | Performed by: FAMILY MEDICINE

## 2018-03-07 PROCEDURE — 80048 BASIC METABOLIC PNL TOTAL CA: CPT | Performed by: FAMILY MEDICINE

## 2018-03-07 PROCEDURE — 36415 COLL VENOUS BLD VENIPUNCTURE: CPT | Performed by: FAMILY MEDICINE

## 2018-03-07 PROCEDURE — 84703 CHORIONIC GONADOTROPIN ASSAY: CPT | Performed by: FAMILY MEDICINE

## 2018-03-07 PROCEDURE — 82306 VITAMIN D 25 HYDROXY: CPT | Performed by: FAMILY MEDICINE

## 2018-03-07 PROCEDURE — 99395 PREV VISIT EST AGE 18-39: CPT | Performed by: FAMILY MEDICINE

## 2018-03-07 RX ORDER — FLUTICASONE PROPIONATE 0.05 MG/G
OINTMENT TOPICAL 2 TIMES DAILY
Qty: 60 G | Refills: 3 | Status: SHIPPED | OUTPATIENT
Start: 2018-03-07

## 2018-03-07 ASSESSMENT — PAIN SCALES - GENERAL: PAINLEVEL: NO PAIN (0)

## 2018-03-07 NOTE — NURSING NOTE
"Chief Complaint   Patient presents with     Physical       Initial /64 (BP Location: Right arm, Patient Position: Chair, Cuff Size: Adult Regular)  Pulse 81  Temp 97.4  F (36.3  C) (Oral)  Wt 132 lb (59.9 kg)  LMP 03/01/2018  SpO2 97%  BMI 24.14 kg/m2 Estimated body mass index is 24.14 kg/(m^2) as calculated from the following:    Height as of 5/23/17: 5' 2\" (1.575 m).    Weight as of this encounter: 132 lb (59.9 kg).  Medication Reconciliation: complete   Trudi Spencer MA      "

## 2018-03-07 NOTE — TELEPHONE ENCOUNTER
Called patient with  and notified patient of the message below per PCP.    Patient stated understanding and agreeable with the plan of care. Tyesha Robison, RN-BSN, [facility] Triage.

## 2018-03-07 NOTE — PROGRESS NOTES
SUBJECTIVE:   CC: Adina Sánchez is an 37 year old woman who presents for preventive health visit.     Healthy Habits:    Do you get at least three servings of calcium containing foods daily (dairy, green leafy vegetables, etc.)? yes    Amount of exercise or daily activities, outside of work: Walking    Problems taking medications regularly No    Medication side effects: No    Have you had an eye exam in the past two years? no    Do you see a dentist twice per year? no    Do you have sleep apnea, excessive snoring or daytime drowsiness?no      Brings 14 month old son with her today - he had lung surgery in November and is doing well. She has stopped working since then to care for her children. Coping well with this.     Requesting refills of fluticasone for hx of eczema on face.     Today's PHQ-2 Score:   PHQ-2 ( 1999 Pfizer) 3/2/2017   Q1: Little interest or pleasure in doing things 0   Q2: Feeling down, depressed or hopeless 0   PHQ-2 Score 0     Abuse: Current or Past(Physical, Sexual or Emotional)- No  Do you feel safe in your environment - Yes        Social History   Substance Use Topics     Smoking status: Never Smoker     Smokeless tobacco: Not on file     Alcohol use Not on file     If you drink alcohol do you typically have >3 drinks per day or >7 drinks per week? No                     Reviewed orders with patient.  Reviewed health maintenance and updated orders accordingly - Yes  Labs reviewed in EPIC  BP Readings from Last 3 Encounters:   03/07/18 106/64   10/30/17 105/67   05/23/17 106/72    Wt Readings from Last 3 Encounters:   03/07/18 132 lb (59.9 kg)   10/30/17 127 lb (57.6 kg)   05/23/17 131 lb 9.6 oz (59.7 kg)                    Mammogram not appropriate for this patient based on age.    Pertinent mammograms are reviewed under the imaging tab.  History of abnormal Pap smear: NO - age 30-65 PAP every 5 years with negative HPV co-testing recommended    Reviewed and updated as needed this  visit by clinical staff  Allergies  Meds  Med Hx  Surg Hx  Fam Hx  Soc Hx        Reviewed and updated as needed this visit by Provider        Past Medical History:   Diagnosis Date     Cardiomyopathy (H)         ROS:  CONSTITUTIONAL:POSITIVE  for weight gain  I: NEGATIVE for worrisome rashes, moles or lesions  E: NEGATIVE for vision changes or irritation  ENT: NEGATIVE for ear, mouth and throat problems  R: NEGATIVE for significant cough or SOB  B: NEGATIVE for masses, tenderness or discharge  CV: NEGATIVE for chest pain, palpitations or peripheral edema  GI: NEGATIVE for nausea, abdominal pain, heartburn, or change in bowel habits  : NEGATIVE for unusual urinary or vaginal symptoms. Periods are regular.  M: NEGATIVE for significant arthralgias or myalgia  N: NEGATIVE for weakness, dizziness or paresthesias  P: NEGATIVE for changes in mood or affect    OBJECTIVE:   /64 (BP Location: Right arm, Patient Position: Chair, Cuff Size: Adult Regular)  Pulse 81  Temp 97.4  F (36.3  C) (Oral)  Wt 132 lb (59.9 kg)  LMP 03/01/2018  SpO2 97%  BMI 24.14 kg/m2  EXAM:  GENERAL: healthy, alert and no distress  EYES: Eyes grossly normal to inspection, PERRL and conjunctivae and sclerae normal  NECK: no adenopathy, no asymmetry, masses, or scars and thyroid normal to palpation  RESP: lungs clear to auscultation - no rales, rhonchi or wheezes  CV: regular rate and rhythm, normal S1 S2, no S3 or S4, no murmur, click or rub, no peripheral edema and peripheral pulses strong  ABDOMEN: soft, nontender, no hepatosplenomegaly, no masses and bowel sounds normal  MS: no gross musculoskeletal defects noted, no edema  SKIN: keloid - left thorax and dry patch on left forehead  BACK: no CVA tenderness, no paralumbar tenderness  PSYCH: mentation appears normal, affect normal/bright    ASSESSMENT/PLAN:       ICD-10-CM    1. Well woman exam (no gynecological exam) Z00.00 BASIC METABOLIC PANEL     CBC with platelets and differential  "  2. Weight gain R63.5 Beta HCG qual IFA urine   3. Screening for lipid disorders Z13.220 Lipid panel reflex to direct LDL Fasting   4. Screening for diabetes mellitus Z13.1    5. Encounter for vitamin deficiency screening Z13.21 Vitamin D Deficiency   6. Other eczema L30.8 fluticasone propionate (CUTIVATE) 0.005 % ointment     Patient concerned about weight gain, requests pregnancy test, counseled that pregnancy is rare with IUD insertion.     COUNSELING:   Reviewed preventive health counseling, as reflected in patient instructions       Regular exercise       Healthy diet/nutrition       Contraception         reports that she has never smoked. She does not have any smokeless tobacco history on file.    Estimated body mass index is 24.14 kg/(m^2) as calculated from the following:    Height as of 5/23/17: 5' 2\" (1.575 m).    Weight as of this encounter: 132 lb (59.9 kg).       Counseling Resources:  ATP IV Guidelines  Pooled Cohorts Equation Calculator  Breast Cancer Risk Calculator  FRAX Risk Assessment  ICSI Preventive Guidelines  Dietary Guidelines for Americans, 2010  USDA's MyPlate  ASA Prophylaxis  Lung CA Screening    Lauren A. Engelmann, MD  [facility]  "

## 2018-03-07 NOTE — PROGRESS NOTES
Please call Adina and let her know that her pregnancy test is negative. We will mail her the results of the other labs when they're all back. Thanks.     Lauren Engelmann, MD

## 2018-03-07 NOTE — TELEPHONE ENCOUNTER
Please call Adina and let her know that her pregnancy test is negative. We will mail her the results of the other labs when they're all back. Thanks.

## 2018-03-07 NOTE — MR AVS SNAPSHOT
After Visit Summary   3/7/2018    Adina Sánchez    MRN: [id number]           Patient Information     Date Of Birth          1980        Visit Information        Provider Department      3/7/2018 8:15 AM Engelmann, Lauren Anneliese, MD; [facility]        Today's Diagnoses     Well woman exam (no gynecological exam)    -  1    Weight gain        Screening for lipid disorders        Screening for diabetes mellitus        Encounter for vitamin deficiency screening        Other eczema          Care Instructions      Pautas de prevención para mujeres de entre 18 y 39 años de edad  Las pruebas de detección y las vacunas son importantes para el manejo de garcia maria isabel. La consejería sobre garcia maria isabel también es esencial. A continuación, verá pautas en relación con esos temas para mujeres de entre 18 y 39 años de edad. Hable con garcia proveedor de atención médica para asegurarse de que está al día con todo lo que necesita.  Prueba/Análisis Quiénes Frecuencia   Uso indebido del alcohol Todas las mujeres de jazmyn eda de edad En los exámenes de rutina   Presión arterial Todas las mujeres de jazmyn eda de edad Cada dos años si garcia presión arterial es inferior a 120/80 mm Hg, samira vez al año si garcia presión arterial sistólica es de entre 120 y 139 mm Hg o garcia presión arterial diastólica es de entre 80 y 89 mm Hg   Cáncer de seno Todas las mujeres de jazmyn eda de edad deben saber cómo se jrery y se sienten normalmente gallo senos. Así podrán notar cualquier cambio de inmediato e informárselo a garcia proveedor de atención médica1 En los exámenes de rutina1   Cáncer del gianfranco uterino Las mujeres de 21 años o más Las mujeres de entre 21 y 29 años deben hacer un Papanicolau cada wendy años; las mujeres de entre 30 y 65 deben hacerse un Papanicolau y también samira prueba de VPH cada brinda años   Clamidia Las mujeres sexualmente activas menores de 24 años y las mujeres que tienen mayor  riesgo de infección Cada duke años si tiene riesgo o si tiene síntomas   Depresión Todas las mujeres de jazmyn eda de edad En los exámenes de rutina   Diabetes mellitus, tipo 2 Las adultas que no tengan síntomas, que tengan sobrepeso o que savanna obesas y tengan 1 o más riesgos adicionales de diabetes Al menos cada duke años   Gonorrea Las mujeres sexualmente activas con mayor riesgo de infección En los exámenes de rutina   Hepatitis C Todas las mujeres que tienen mayor riesgo En los exámenes de rutina   VIH Todas las mujeres En los exámenes de rutina   Obesidad Todas las mujeres de jazmyn eda de edad En los exámenes de rutina   Sífilis Las mujeres con mayor riesgo de infección; hable con garcia proveedor de atención médica En los exámenes de rutina   Tuberculosis Las mujeres con mayor riesgo de infección; hable con garcia proveedor de atención médica Consulte a garcia proveedor de atención médica   [place] Todas las mujeres de jazmyn eda de edad Al menos un examen completo entre los 20 y los 30 años, y dos entre los 30 y los 40 años   Vacuna2 Quiénes Frecuencia   Varicela Todas las mujeres de jazmyn eda de edad que no tienen registro de vinny tenido esta infección o haberse aplicado esta vacuna Dos dosis. La segunda dosis debería aplicársela entre cuatro y ocho semanas después de la primera dosis   Hepatitis A Las mujeres con mayor riesgo de infección; hable con garcia proveedor de atención médica Dos dosis que se aplican al menos con seis meses de distancia   Hepatitis B Las mujeres con mayor riesgo de infección; hable con garcia proveedor de atención médica [place] dosis a lo gracy de seis meses; la segunda dosis debería aplicarse un mes después de la primera dosis; la tercera dosis debería aplicarse al menos dos meses después de la segunda dosis y, al menos, cuatro meses después de la primera dosis   Haemophilus influenzae Tipo B (HIB)  Las mujeres con mayor riesgo de infección; hable con garcia proveedor de atención médica Renetta a duke dosis    Virus del papiloma humano (VPH) Todas las mujeres de jazmyn eda de edad hasta los 26 años Duke dosis; la segunda dosis debería aplicarse entre donell y dos meses después de la primera dosis, y la tercera dosis debería aplicarse seis meses después de la primera dosis   Gripe (flu) Todas las mujeres de jazmyn eda de edad Samira vez al año   Sarampión, paperas y rubéola ( MMR  por gallo siglas en [facility]) Todas las mujeres de jazmyn eda de edad que no tienen registro de vinny tenido estas infecciones o haberse aplicado estas vacunas Samira o dos dosis   Antimeningocócica Las mujeres con mayor riesgo de infección; hable con garcia proveedor de atención médica Samira dosis o más   Antineumocócica conjugada (PCV13) y de polisacáridos (PPSV23)  Las mujeres con mayor riesgo de infección; hable con garcia proveedor de atención médica PCV13: samira dosis entre los 19 y 65 años de edad (protege contra 13 tipos de bacteria neumocócica)    PPSV23: samira a dos dosis hasta los 64 años de edad, o samira dosis a partir de los 65 años (protege contra 23 tipos de bacteria neumocócica)   Refuerzo de tétanos/difteria/tos ferina ( Td/Tdap  por gallo siglas en [facility]) Todas las mujeres de jazmyn eda de edad Td cada yazmin años o samira única dosis de Tdap en lugar de un refuerzo de Td después de los 18 años. Luego, Td cada yazmin años   Consejería Quiénes Frecuencia   Pruebas sobre mutación de los genes BRCA para renetta el riesgo de tener cáncer de ovario y cáncer de seno Las mujeres con mayor riesgo de tener mutación de los genes Cuando se conoce garcia riesgo   Cáncer de seno y prevención química del cáncer Las mujeres con alto riesgo de cáncer de seno Cuando se conoce garcia riesgo   Dieta y actividad física Las mujeres con sobrepeso u obesidad Cuando se diagnostica y, luego, en los exámenes de rutina   Violencia doméstica Las mujeres en edad de tener hijos En los exámenes de rutina   Prevención de infecciones de trasmisión sexual Las mujeres sexualmente activas En los exámenes de  rutina   Cáncer de piel Prevención de cáncer de piel en mujeres de piel kezia hasta los 24 años En los exámenes de rutina   Uso del tabaco y los efectos que puede tener sobre la maria isabel Todas las mujeres de jazmyn eda de edad Todas las visitas   1Según la American Cancer Society, las mujeres entre los 20 y los 39 años de edad deberían hacerse un examen clínico de los senos (CBE, por gallo siglas en inglés) ronald parte de gallo exámenes de maria isabel de rutina cada 3 años. Los autoexámenes de los senos son samira opción para las mujeres a partir de los 20 años. Lance la U.S. Preventive Task Force no recomienda los exámenes clínicos de los senos.  2 Las personas de 18 años o más que no estén al día con las vacunas de la niñez deben recibir todas las vacunas de rescate recomendadas por los CDC.  Date Last Reviewed: 8/27/2015 2000-2017 [employer]. [address] [place] [address]. All rights reserved. This information is not intended as a substitute for professional medical care. Always follow your healthcare professional's instructions.                Follow-ups after your visit        Who to contact     If you have questions or need follow up information about today's clinic visit or your schedule please contact [facility] directly at [phone].  Normal or non-critical lab and imaging results will be communicated to you by MyChart, letter or phone within 4 business days after the clinic has received the results. If you do not hear from us within 7 days, please contact the clinic through [employer] or phone. If you have a critical or abnormal lab result, we will notify you by phone as soon as possible.  Submit refill requests through S[employer] or call your pharmacy and they will forward the refill request to us. Please allow 3 business days for your refill to be completed.          Additional Information About Your Visit        Soteira Information     S[employer]ira lets you send messages to  "your doctor, view your test results, renew your prescriptions, schedule appointments and more. To sign up, go to [email or web address] . Click on \"Log in\" on the left side of the screen, which will take you to the Welcome page. Then click on \"Sign up Now\" on the right side of the page.     You will be asked to enter the access code listed below, as well as some personal information. Please follow the directions to create your username and password.     Your access code is: [email or web address]  Expires: 2018  9:08 AM     Your access code will  in 90 days. If you need help or a new code, please call your [facility] clinic or [phone].        Care EveryWhere ID     This is your Care EveryWhere ID. This could be used by other organizations to access your [facility] medical records  [id number]        Your Vitals Were     Pulse Temperature Last Period Pulse Oximetry BMI (Body Mass Index)       81 97.4  F (36.3  C) (Oral) 2018 97% 24.14 kg/m2        Blood Pressure from Last 3 Encounters:   18 106/64   10/30/17 105/67   17 106/72    Weight from Last 3 Encounters:   18 132 lb (59.9 kg)   10/30/17 127 lb (57.6 kg)   17 131 lb 9.6 oz (59.7 kg)              We Performed the Following     BASIC METABOLIC PANEL     Beta HCG qual IFA urine     CBC with platelets and differential     Lipid panel reflex to direct LDL Fasting     Vitamin D Deficiency          Today's Medication Changes          These changes are accurate as of 3/7/18  9:08 AM.  If you have any questions, ask your nurse or doctor.               Start taking these medicines.        Dose/Directions    fluticasone propionate 0.005 % ointment   Commonly known as:  CUTIVATE   Used for:  Other eczema   Started by:  Engelmann, Lauren Anneliese, MD        Apply topically 2 times daily   Quantity:  60 g   Refills:  3            Where to get your medicines      These medications were sent to [facility] " [place], [place] - [address]  [address], [facility] [address]     Phone:  [phone]     fluticasone propionate 0.005 % ointment                Primary Care Provider Office Phone # Fax #    Lauren Anneliese Engelmann, [phone]       [facility] [address] [facility] [address]        Equal Access to Services     ROSY RAMOS : Hadii aad ku hadasho Soomaali, waaxda luqadaha, qaybta kaalmada adeegyada, waxay idiin hayaan adeeg khnomish la'lenin . So [facility] [phone].    ATENCIÓN: Si habla espbahman, tiene a garcia disposición servicios gratuitos de asistencia lingüística. Chetna al [phone].    We comply with applicable federal civil rights laws and [place] laws. We do not discriminate on the basis of race, color, national origin, age, disability, sex, sexual orientation, or gender identity.            Thank you!     Thank you for choosing [facility]  for your care. Our goal is always to provide you with excellent care. Hearing back from our patients is one way we can continue to improve our services. Please take a few minutes to complete the written survey that you may receive in the mail after your visit with us. Thank you!             Your Updated Medication List - Protect others around you: Learn how to safely use, store and throw away your medicines at www.disposemymeds.org.          This list is accurate as of 3/7/18  9:08 AM.  Always use your most recent med list.                   Brand Name Dispense Instructions for use Diagnosis    amoxicillin-clavulanate 875-125 MG per tablet    AUGMENTIN    20 tablet    Take 1 tablet by mouth 2 times daily    Acute suppurative otitis media of right ear with spontaneous rupture of tympanic membrane, recurrence not specified       fluticasone propionate 0.005 % ointment    CUTIVATE    60 g    Apply topically 2 times daily    Other eczema       levonorgestrel 20 MCG/24HR IUD     MIRENA    1 each    1 each (20 mcg) by Intrauterine route once for 1 dose    Encounter for IUD insertion       oxyCODONE HCl 5 MG Taba    OXECTA     Reported on 5/23/2017        Prenatal Vitamin 27-0.8 MG Tabs

## 2018-03-07 NOTE — PATIENT INSTRUCTIONS
Pautas de prevención para mujeres de entre 18 y 39 años de edad  Las pruebas de detección y las vacunas son importantes para el manejo de garcia maria isabel. La consejería sobre garcia maria isabel también es esencial. A continuación, verá pautas en relación con esos temas para mujeres de entre 18 y 39 años de edad. Hable con garcia proveedor de atención médica para asegurarse de que está al día con todo lo que necesita.  Prueba/Análisis Quiénes Frecuencia   Uso indebido del alcohol Todas las mujeres de jazmyn eda de edad En los exámenes de rutina   Presión arterial Todas las mujeres de jazmyn eda de edad Cada dos años si garcia presión arterial es inferior a 120/80 mm Hg, samira vez al año si garcia presión arterial sistólica es de entre 120 y 139 mm Hg o garcia presión arterial diastólica es de entre 80 y 89 mm Hg   Cáncer de seno Todas las mujeres de jazmyn eda de edad deben saber cómo se jerry y se sienten normalmente gallo senos. Así podrán notar cualquier cambio de inmediato e informárselo a garcia proveedor de atención médica1 En los exámenes de rutina1   Cáncer del gianfranco uterino Las mujeres de 21 años o más Las mujeres de entre 21 y 29 años deben hacer un Papanicolau cada wendy años; las mujeres de entre 30 y 65 deben hacerse un Papanicolau y también samira prueba de VPH cada brinda años   Clamidia Las mujeres sexualmente activas menores de 24 años y las mujeres que tienen mayor riesgo de infección Cada wendy años si tiene riesgo o si tiene síntomas   Depresión Todas las mujeres de jazmyn eda de edad En los exámenes de rutina   Diabetes mellitus, tipo 2 Las adultas que no tengan síntomas, que tengan sobrepeso o que savanna obesas y tengan 1 o más riesgos adicionales de diabetes Al menos cada wendy años   Gonorrea Las mujeres sexualmente activas con mayor riesgo de infección En los exámenes de rutina   Hepatitis C Todas las mujeres que tienen mayor riesgo En los exámenes de rutina   VIH Todas las mujeres En los exámenes de rutina   Obesidad Todas las mujeres de  jazmyn eda de edad En los exámenes de rutina   Sífilis Las mujeres con mayor riesgo de infección; hable con garcia proveedor de atención médica En los exámenes de rutina   Tuberculosis Las mujeres con mayor riesgo de infección; hable con garcia proveedor de atención médica Consulte a garcia proveedor de atención médica   [place] Todas las mujeres de jazmyn eda de edad Al menos un examen completo entre los 20 y los 30 años, y dos entre los 30 y los 40 años   Vacuna2 Quiénes Frecuencia   Varicela Todas las mujeres de jazmyn eda de edad que no tienen registro de vinny tenido esta infección o haberse aplicado esta vacuna Dos dosis. La segunda dosis debería aplicársela entre cuatro y ocho semanas después de la primera dosis   Hepatitis A Las mujeres con mayor riesgo de infección; hable con garcia proveedor de atención médica Dos dosis que se aplican al menos con seis meses de distancia   Hepatitis B Las mujeres con mayor riesgo de infección; hable con garcia proveedor de atención médica [facility] dosis a lo gracy de seis meses; la segunda dosis debería aplicarse un mes después de la primera dosis; la tercera dosis debería aplicarse al menos dos meses después de la segunda dosis y, al menos, cuatro meses después de la primera dosis   Haemophilus influenzae Tipo B (HIB)  Las mujeres con mayor riesgo de infección; hable con garcia proveedor de atención médica Renetta a duke dosis   Virus del papiloma humano (VPH) Todas las mujeres de jazmyn eda de edad hasta los 26 años [facility] dosis; la segunda dosis debería aplicarse entre donell y dos meses después de la primera dosis, y la tercera dosis debería aplicarse seis meses después de la primera dosis   Gripe (flu) Todas las mujeres de jazmyn eda de edad Renetta vez al año   Sarampión, paperas y rubéola ( MMR  por gallo siglas en inglés) Todas las mujeres de jazmyn eda de edad que no tienen registro de vinny tenido estas infecciones o haberse aplicado estas vacunas Renetta o dos dosis   Antimeningocócica Las mujeres con mayor  riesgo de infección; hable con garcia proveedor de atención médica Samira dosis o más   Antineumocócica conjugada (PCV13) y de polisacáridos (PPSV23)  Las mujeres con mayor riesgo de infección; hable con garcia proveedor de atención médica PCV13: samira dosis entre los 19 y 65 años de edad (protege contra 13 tipos de bacteria neumocócica)    PPSV23: samira a dos dosis hasta los 64 años de edad, o samira dosis a partir de los 65 años (protege contra 23 tipos de bacteria neumocócica)   Refuerzo de tétanos/difteria/tos ferina ( Td/Tdap  por gallo siglas en inglés) Todas las mujeres de jazmyn eda de edad Td cada yazmin años o samira única dosis de Tdap en lugar de un refuerzo de Td después de los 18 años. Luego, Td cada yazmin años   Consejería Quiénes Frecuencia   Pruebas sobre mutación de los genes BRCA para renetta el riesgo de tener cáncer de ovario y cáncer de seno Las mujeres con mayor riesgo de tener mutación de los genes Cuando se conoce garcia riesgo   Cáncer de seno y prevención química del cáncer Las mujeres con alto riesgo de cáncer de seno Cuando se conoce garcia riesgo   Dieta y actividad física Las mujeres con sobrepeso u obesidad Cuando se diagnostica y, luego, en los exámenes de rutina   Violencia doméstica Las mujeres en edad de tener hijos En los exámenes de rutina   Prevención de infecciones de trasmisión sexual Las mujeres sexualmente activas En los exámenes de rutina   Cáncer de piel Prevención de cáncer de piel en mujeres de piel kezia hasta los 24 años En los exámenes de rutina   Uso del tabaco y los efectos que puede tener sobre la maria isabel Todas las mujeres de jazmyn eda de edad Todas las visitas   1Según la American [employer] Society, las mujeres entre los 20 y los 39 años de edad deberían hacerse un examen clínico de los senos (CBE, por gallo siglas en inglés) ronald parte de gallo exámenes de maria isabel de rutina cada 3 años. Los autoexámenes de los senos son samira opción para las mujeres a partir de los 20 años. Lance la U.S. Preventive Task Force no  recomienda los exámenes clínicos de los senos.  2 Las personas de 18 años o más que no estén al día con las vacunas de la niñez deben recibir todas las vacunas de rescate recomendadas por los CDC.  Date Last Reviewed: 8/27/2015 2000-2017 The [employer]. [address] [place] [address]. All rights reserved. This information is not intended as a substitute for professional medical care. Always follow your healthcare professional's instructions.

## 2018-03-08 LAB — DEPRECATED CALCIDIOL+CALCIFEROL SERPL-MC: 15 UG/L (ref 20–75)

## 2018-03-30 ENCOUNTER — TELEPHONE (OUTPATIENT)
Dept: FAMILY MEDICINE | Facility: CLINIC | Age: 38
End: 2018-03-30

## 2018-03-30 NOTE — TELEPHONE ENCOUNTER
Please call patient with  and let her know that her vitamin D level is very low, and she needs to take rx strength supplements. I will send this to her pharmacy. Otherwise, labs look good. Thank you.

## 2018-03-30 NOTE — TELEPHONE ENCOUNTER
Called [phone] (home) using [employer] .  Left message on voicemail to return call to triage.  Tyesha Robison RN [facility] Triage.

## 2018-04-03 NOTE — TELEPHONE ENCOUNTER
Called patient at [phone] (home) using . Left message on voicemail to return phone call to triage.  Tyesha Robison RN [facility] Triage.

## 2018-04-05 NOTE — TELEPHONE ENCOUNTER
Notified patient of the message below per PCP, using [employer].    Patient stated understanding and agreeable with the plan of care. Tyesha Robison, RN-BSN, [facility] Triage.

## 2018-11-01 ENCOUNTER — OFFICE VISIT (OUTPATIENT)
Dept: FAMILY MEDICINE | Facility: CLINIC | Age: 38
End: 2018-11-01
Payer: COMMERCIAL

## 2018-11-01 VITALS
HEART RATE: 61 BPM | WEIGHT: 135 LBS | BODY MASS INDEX: 24.69 KG/M2 | SYSTOLIC BLOOD PRESSURE: 99 MMHG | OXYGEN SATURATION: 100 % | TEMPERATURE: 97.2 F | DIASTOLIC BLOOD PRESSURE: 55 MMHG

## 2018-11-01 DIAGNOSIS — R10.2 PELVIC PAIN IN FEMALE: Primary | ICD-10-CM

## 2018-11-01 DIAGNOSIS — Z97.5 IUD (INTRAUTERINE DEVICE) IN PLACE: ICD-10-CM

## 2018-11-01 LAB
ALBUMIN UR-MCNC: NEGATIVE MG/DL
APPEARANCE UR: CLEAR
BILIRUB UR QL STRIP: NEGATIVE
COLOR UR AUTO: YELLOW
GLUCOSE UR STRIP-MCNC: NEGATIVE MG/DL
HGB UR QL STRIP: NEGATIVE
KETONES UR STRIP-MCNC: NEGATIVE MG/DL
LEUKOCYTE ESTERASE UR QL STRIP: NEGATIVE
MUCOUS THREADS #/AREA URNS LPF: PRESENT /LPF
NITRATE UR QL: NEGATIVE
PH UR STRIP: 5.5 PH (ref 5–7)
RBC #/AREA URNS AUTO: ABNORMAL /HPF
SOURCE: ABNORMAL
SP GR UR STRIP: 1.02 (ref 1–1.03)
UROBILINOGEN UR STRIP-ACNC: 0.2 EU/DL (ref 0.2–1)
WBC #/AREA URNS AUTO: ABNORMAL /HPF

## 2018-11-01 PROCEDURE — 99214 OFFICE O/P EST MOD 30 MIN: CPT | Performed by: FAMILY MEDICINE

## 2018-11-01 PROCEDURE — 81001 URINALYSIS AUTO W/SCOPE: CPT | Performed by: FAMILY MEDICINE

## 2018-11-01 ASSESSMENT — PAIN SCALES - GENERAL: PAINLEVEL: NO PAIN (0)

## 2018-11-01 NOTE — MR AVS SNAPSHOT
After Visit Summary   11/1/2018    Adina Sánchez    MRN: [id number]           Patient Information     Date Of Birth          1980        Visit Information        Provider Department      11/1/2018 12:45 PM Engelmann, Lauren Anneliese, MD; [facility]        Today's Diagnoses     Pelvic pain in female    -  1      Care Instructions    I will call you with the lab and with the ultrasound results.           Follow-ups after your visit        Your next 10 appointments already scheduled     Nov 05, 2018 10:30 AM CST   (Arrive by 10:15 AM)   US PELVIC COMPLETE W TRANSVAGINAL with FKUS1   [facility] ([facility])    [address] [place] [address] [phone]           How do I prepare for my exam? (Food and drink instructions) Adults: Drink four 8-ounce glasses of fluid an hour before your exam. If you need to empty your bladder before your exam, try to release only a little urine. Then, drink another glass of fluid.  Children: * Children who are potty trained up to 6 years old should drink at least 2 cups (16 oz) of water/non-carbonated beverage 30 minutes prior to the exam. * Children who are 6-10 years should drink at least 3 cups (24 oz) of water/non-carbonated beverage 45 minutes prior to the exam. * Children who are 10 years or older should drink at least 4 cups (32 oz) of water/non-carbonated beverage 45 minutes prior to the exam.  If your child is very uncomfortable or has an urgent need to pee, please notify a technologist; they will try to find out how much longer the wait may be and provide instructions to help relieve the pressure.  What should I wear: Wear comfortable clothes.  How long does the exam take: Most ultrasounds take 30 to 60 minutes.  What should I bring: Bring a list of your medicines, including vitamins, minerals and over-the-counter drugs. It is safest to leave personal items at home.   Do I need a :  No  is needed.  What do I need to tell my doctor: Tell your doctor about any allergies you may have.  What should I do after the exam: No restrictions, You may resume normal activities.  What is this test: An ultrasound uses sound waves to make pictures of the body. Sound waves do not cause pain. The only discomfort may be the pressure of the wand against your skin or full bladder.  Who should I call with questions: If you have any questions, please call the Imaging Department where you will have your exam. Directions, parking instructions, and other information is available on our website, [email or web address].              Future tests that were ordered for you today     Open Future Orders        Priority Expected Expires Ordered    US Pelvic Complete w Transvaginal Routine  11/1/2019 11/1/2018            Who to contact     If you have questions or need follow up information about today's clinic visit or your schedule please contact [facility] directly at [phone].  Normal or non-critical lab and imaging results will be communicated to you by MyChart, letter or phone within 4 business days after the clinic has received the results. If you do not hear from us within 7 days, please contact the clinic through MyChart or phone. If you have a critical or abnormal lab result, we will notify you by phone as soon as possible.  Submit refill requests through [employer] or call your pharmacy and they will forward the refill request to us. Please allow 3 business days for your refill to be completed.          Additional Information About Your Visit        Care EveryWhere ID     This is your Care EveryWhere ID. This could be used by other organizations to access your [place] medical records  [id number]        Your Vitals Were     Pulse Temperature Last Period Pulse Oximetry BMI (Body Mass Index)       61 97.2  F (36.2  C) (Oral) 09/10/2018 100% 24.69 kg/m2        Blood  Pressure from Last 3 Encounters:   11/01/18 99/55   03/07/18 106/64   10/30/17 105/67    Weight from Last 3 Encounters:   11/01/18 135 lb (61.2 kg)   03/07/18 132 lb (59.9 kg)   10/30/17 127 lb (57.6 kg)              We Performed the Following     UA with Microscopic reflex to Culture        Primary Care Provider Office Phone # Fax #    Lauren Anneliese Engelmann, [phone]       [facility] [address]  [facility] [address]        Equal Access to Services     [facility] [place] [facility] : Hadii aad ku hadasho Soomaali, waaxda luqadaha, qaybta kaalmada adealisyada, martin gutierrez . So [facility] [phone].    ATENCIÓN: Si habla español, tiene a garcia disposición servicios gratuitos de asistencia lingüística. Llame al [phone].    We comply with applicable federal civil rights laws and [place] laws. We do not discriminate on the basis of race, color, national origin, age, disability, sex, sexual orientation, or gender identity.            Thank you!     Thank you for choosing [facility]  for your care. Our goal is always to provide you with excellent care. Hearing back from our patients is one way we can continue to improve our services. Please take a few minutes to complete the written survey that you may receive in the mail after your visit with us. Thank you!             Your Updated Medication List - Protect others around you: Learn how to safely use, store and throw away your medicines at www.disposemymeds.org.          This list is accurate as of 11/1/18  1:41 PM.  Always use your most recent med list.                   Brand Name Dispense Instructions for use Diagnosis    fluticasone propionate 0.005 % ointment    CUTIVATE    60 g    Apply topically 2 times daily    Other eczema       levonorgestrel 20 MCG/24HR IUD    MIRENA    1 each    1 each (20 mcg) by Intrauterine route once for 1 dose    Encounter for IUD insertion        Prenatal Vitamin 27-0.8 MG Tabs

## 2018-11-01 NOTE — PROGRESS NOTES
SUBJECTIVE:   Adina Sánchez is a 38 year old female who presents to clinic today for the following health issues:    Has had Mirena since 2017.   Hasn't had a period in over a month, worried about that.   Had one day of back pain and nausea. Self-resolved with no treatment.  Also had 15 mins of severe pain in pelvic area that has never happened before and is very worrisome to her. This was at night while she was trying to fall asleep.   She didn't have any of these issues with her first IUD.     Problem list and histories reviewed & adjusted, as indicated.  Additional history: as documented    Patient Active Problem List   Diagnosis     Pregnancy complicated by fetal lung lesion     Cardiomyopathy (H)     [facility]     Encounter for IUD insertion     Keloid scar     Past Surgical History:   Procedure Laterality Date      SECTION         Social History   Substance Use Topics     Smoking status: Never Smoker     Smokeless tobacco: Not on file     Alcohol use Not on file     History reviewed. No pertinent family history.        Reviewed and updated as needed this visit by clinical staff  Allergies  Meds  Med Hx  Surg Hx  Fam Hx  Soc Hx      Reviewed and updated as needed this visit by Provider         ROS:  Constitutional, HEENT, cardiovascular, pulmonary, gi and gu systems are negative, except as otherwise noted.    OBJECTIVE:     BP 99/55 (BP Location: Right arm, Patient Position: Chair, Cuff Size: Adult Regular)  Pulse 61  Temp 97.2  F (36.2  C) (Oral)  Wt 135 lb (61.2 kg)  LMP 09/10/2018  SpO2 100%  BMI 24.69 kg/m2  Body mass index is 24.69 kg/(m^2).  GENERAL: healthy, alert and no distress  NECK: no adenopathy, no asymmetry, masses, or scars and thyroid normal to palpation  RESP: lungs clear to auscultation - no rales, rhonchi or wheezes  CV: regular rate and rhythm, normal S1 S2, no S3 or S4, no murmur, click or  rub, no peripheral edema and peripheral pulses strong  ABDOMEN: soft, nontender, no hepatosplenomegaly, no masses and bowel sounds normal  SKIN: no suspicious lesions or rashes  BACK: no CVA tenderness, no paralumbar tenderness    Diagnostic Test Results:  Results for orders placed or performed in visit on 18 (from the past 24 hour(s))   UA with Microscopic reflex to Culture   Result Value Ref Range    Color Urine Yellow     Appearance Urine Clear     Glucose Urine Negative NEG^Negative mg/dL    Bilirubin Urine Negative NEG^Negative    Ketones Urine Negative NEG^Negative mg/dL    Specific Gravity Urine 1.025 1.003 - 1.035    pH Urine 5.5 5.0 - 7.0 pH    Protein Albumin Urine Negative NEG^Negative mg/dL    Urobilinogen Urine 0.2 0.2 - 1.0 EU/dL    Nitrite Urine Negative NEG^Negative    Blood Urine Negative NEG^Negative    Leukocyte Esterase Urine Negative NEG^Negative    Source Midstream Urine     WBC Urine 0 - 5 OTO5^0 - 5 /HPF    RBC Urine O - 2 OTO2^O - 2 /HPF    Mucous Urine Present (A) NEG^Negative /LPF       ASSESSMENT/PLAN:       ICD-10-CM    1. Pelvic pain in female R10.2 US Pelvic Complete w Transvaginal     UA with Microscopic reflex to Culture   2. IUD (intrauterine device) in place Z97.5      Likely just discomfort related to menstrual cycle, advised that her hormones fluctuate based on the month even if she is not having a period.   Given the fact that her IUD was placed after , will eval position with transvaginal ultrasound.     See Patient Instructions    Lauren A. Engelmann, MD  [facility]

## 2018-11-05 ENCOUNTER — RADIANT APPOINTMENT (OUTPATIENT)
Dept: ULTRASOUND IMAGING | Facility: CLINIC | Age: 38
End: 2018-11-05
Attending: FAMILY MEDICINE
Payer: COMMERCIAL

## 2018-11-05 DIAGNOSIS — R10.2 PELVIC PAIN IN FEMALE: ICD-10-CM

## 2018-11-05 PROCEDURE — 76856 US EXAM PELVIC COMPLETE: CPT

## 2018-11-05 PROCEDURE — 76830 TRANSVAGINAL US NON-OB: CPT

## 2018-11-12 ENCOUNTER — TELEPHONE (OUTPATIENT)
Dept: FAMILY MEDICINE | Facility: CLINIC | Age: 38
End: 2018-11-12

## 2018-11-12 DIAGNOSIS — T83.32XS MALPOSITIONED INTRAUTERINE DEVICE (IUD), SEQUELA: Primary | ICD-10-CM

## 2018-11-12 NOTE — TELEPHONE ENCOUNTER
Called patient with .  Left message on voicemail to return phone call to triage line at [phone].  Tyesha Robison RN C[facility] Triage.

## 2018-11-12 NOTE — TELEPHONE ENCOUNTER
NURSE TO CALL    Called x 2 with no answer.     Please call patient and let her know that she needs to see OB gyn to discuss her IUD. It is in the wrong position and needs to be removed by them. Also, she has an ovarian cyst that may be causing her pain. I have ordered the referral.    Will need .

## 2018-11-13 NOTE — TELEPHONE ENCOUNTER
Called and spoke to patient with Persian interpretor.    She is agreeable to be seen by [facility] location.  She prefers [facility] if possible. Her IUD will need to be checked and possible be removed and replaced.    Please scheduled patient for this week if possible.    Thank you.  Clarissa Garcia RN

## 2018-11-20 ENCOUNTER — TELEPHONE (OUTPATIENT)
Dept: OBGYN | Facility: CLINIC | Age: 38
End: 2018-11-20

## 2018-11-20 ENCOUNTER — OFFICE VISIT (OUTPATIENT)
Dept: OBGYN | Facility: CLINIC | Age: 38
End: 2018-11-20
Payer: COMMERCIAL

## 2018-11-20 VITALS
HEART RATE: 61 BPM | BODY MASS INDEX: 25.06 KG/M2 | SYSTOLIC BLOOD PRESSURE: 102 MMHG | DIASTOLIC BLOOD PRESSURE: 69 MMHG | WEIGHT: 137 LBS | OXYGEN SATURATION: 99 % | RESPIRATION RATE: 16 BRPM

## 2018-11-20 DIAGNOSIS — T83.32XA MALPOSITIONED INTRAUTERINE DEVICE (IUD), INITIAL ENCOUNTER: Primary | ICD-10-CM

## 2018-11-20 DIAGNOSIS — N83.291 COMPLEX CYST OF RIGHT OVARY: ICD-10-CM

## 2018-11-20 PROCEDURE — 99204 OFFICE O/P NEW MOD 45 MIN: CPT | Performed by: OBSTETRICS & GYNECOLOGY

## 2018-11-20 NOTE — MR AVS SNAPSHOT
After Visit Summary   11/20/2018    Adina Sánchez    MRN: [id number]           Patient Information     Date Of Birth          1980        Visit Information        Provider Department      11/20/2018 10:45 AM Jhon Cota MD; [employer] [facility]        Today's Diagnoses     Malpositioned intrauterine device (IUD), initial encounter    -  1    Complex cyst of right ovary           Follow-ups after your visit        Your next 10 appointments already scheduled     Dec 10, 2018 10:30 AM CST   Office Visit with Flores Borges MD   [facility]    [address] [place] [id number] [phone]           Bring a current list of meds and any records pertaining to this visit. For Physicals, please bring immunization records and any forms needing to be filled out. Please arrive 10 minutes early to complete paperwork.              Who to contact     If you have questions or need follow up information about today's clinic visit or your schedule please contact [facility] directly at [phone].  Normal or non-critical lab and imaging results will be communicated to you by MyChart, letter or phone within 4 business days after the clinic has received the results. If you do not hear from us within 7 days, please contact the clinic through MyChart or phone. If you have a critical or abnormal lab result, we will notify you by phone as soon as possible.  Submit refill requests through [employer] or call your pharmacy and they will forward the refill request to us. Please allow 3 business days for your refill to be completed.          Additional Information About Your Visit        Care EveryWhere ID     This is your Care EveryWhere ID. This could be used by other organizations to access your [facility] medical records  [id number]        Your Vitals Were     Pulse Respirations Last Period  Pulse Oximetry BMI (Body Mass Index)       61 16 11/08/2018 99% 25.06 kg/m2        Blood Pressure from Last 3 Encounters:   11/23/18 101/65   11/20/18 102/69   11/01/18 99/55    Weight from Last 3 Encounters:   11/23/18 135 lb (61.2 kg)   11/20/18 137 lb (62.1 kg)   11/01/18 135 lb (61.2 kg)              We Performed the Following     Martita-Operative Worksheet        Primary Care Provider Office Phone # Fax #    Lauren Anneliese Engelmann, [phone]       [facility] [address] [facility] [address]        Equal Access to Services     ROSY RAMOS : Hadii natalie singho Sokandice, waaxda luqadaha, qaybta kaalmada adeegyada, martin arce. So [facility] [phone].    ATENCIÓN: Si habla español, tiene a garcia disposición servicios gratuitos de asistencia lingüística. Llame al [phone].    We comply with applicable federal civil rights laws and [place] laws. We do not discriminate on the basis of race, color, national origin, age, disability, sex, sexual orientation, or gender identity.            Thank you!     [facility] you [facility]  [facility] your [facility]. Our goal is always to provide you with excellent care. Hearing back from our patients is one way we can continue to improve our services. Please take a few minutes to complete the written survey that you may receive in the mail after your visit with us. Thank you!             Your Updated Medication List - Protect others around you: Learn how to safely use, store and throw away your medicines at www.disposemymeds.org.          This list is accurate as of 11/20/18 11:59 PM.  Always use your most recent med list.                   Brand Name Dispense Instructions for use Diagnosis    fluticasone propionate 0.005 % external ointment    CUTIVATE    60 g    Apply topically 2 times daily    Other eczema       levonorgestrel 20 MCG/24HR IUD    MIRENA    1 each    1 each (20  mcg) by Intrauterine route once for 1 dose    Encounter for IUD insertion       Prenatal Vitamin 27-0.8 MG Tabs

## 2018-11-20 NOTE — TELEPHONE ENCOUNTER
Surgery Scheduled.    Date of Surgery/ Procedure: 11/30/18  US guided Hysteroscopy D&C for IUD removal  Time of Surgery/ Procedure: 4:00  Hospital/Surgical Facility: [facility]  Primary Physician: Engelmann  Type of Anesthesia Anticipated: Local with MAC    Surgery packet given to patient.   Told NPO 8-10 hours. Patient was instructed to schedule pre-op exam.  Scheduled 11/23/18 8:00 Dr. Engelmann C.Lichtscheidl, MA 11/20/2018         Surgery Pre-Certification    Medical Record Number: [id number]  Adina Sánchez  YOB: 1980   Phone: [phone] (home) [phone] (work)  Primary Provider: Engelmann, Lauren Anneliese    Reason for Admit:  Same day surgery    Surgeon: LISA Cota MD  Surgical Procedure: Ultrasound guided hysteroscopy D&C to remove IUD  ICD-9 Coded: T83.32XA  Date of Surgery: 11/30/18  Consent signed? No    Date signed: na  Hospital: [facility]  Outpatient    Requestor:  Sisi Roberts     Location:  [facility] [phone]

## 2018-11-23 ENCOUNTER — OFFICE VISIT (OUTPATIENT)
Dept: FAMILY MEDICINE | Facility: CLINIC | Age: 38
End: 2018-11-23
Payer: COMMERCIAL

## 2018-11-23 ENCOUNTER — TELEPHONE (OUTPATIENT)
Dept: OBGYN | Facility: CLINIC | Age: 38
End: 2018-11-23

## 2018-11-23 VITALS
WEIGHT: 135 LBS | SYSTOLIC BLOOD PRESSURE: 101 MMHG | HEART RATE: 80 BPM | TEMPERATURE: 97.1 F | DIASTOLIC BLOOD PRESSURE: 65 MMHG | OXYGEN SATURATION: 97 % | BODY MASS INDEX: 24.69 KG/M2

## 2018-11-23 DIAGNOSIS — T83.32XS MALPOSITIONED INTRAUTERINE DEVICE (IUD), SEQUELA: Primary | ICD-10-CM

## 2018-11-23 PROCEDURE — 99213 OFFICE O/P EST LOW 20 MIN: CPT | Performed by: FAMILY MEDICINE

## 2018-11-23 ASSESSMENT — PAIN SCALES - GENERAL: PAINLEVEL: MODERATE PAIN (5)

## 2018-11-23 NOTE — TELEPHONE ENCOUNTER
Received phone call from  Dr. Moya regarding pt. Please see previous encounter. Will route to TC to cancel surgery scheduled for 11/30/2018. Wilian Erazo RN on 11/23/2018 at 10:11 AM

## 2018-11-23 NOTE — PROGRESS NOTES
SUBJECTIVE:   Adina Sánchez is a 38 year old female who presents to clinic today for the following health issues:    Patient initially presented for a pre-op exam today, but she has decided that she does not want to have surgery.   She is going to [facility] in December for 1 month and wants to wait until January. Additionally, she would rather go to the [facility] [place] [facility] to have it done.   She is having occasional cramping but denies heavy vaginal bleeding.     She does not wish to postpone her trip to [facility].     Problem list and histories reviewed & adjusted, as indicated.  Additional history: as documented    Patient Active Problem List   Diagnosis     Pregnancy complicated by fetal lung lesion     Cardiomyopathy (H)     [facility]     Encounter for IUD insertion     Keloid scar     Past Surgical History:   Procedure Laterality Date      SECTION         Social History   Substance Use Topics     Smoking status: Never Smoker     Smokeless tobacco: Never Used     Alcohol use Not on file     History reviewed. No pertinent family history.        Reviewed and updated as needed this visit by clinical staff  Tobacco  Allergies  Meds  Med Hx  Surg Hx  Fam Hx  Soc Hx      Reviewed and updated as needed this visit by Provider         ROS:  Constitutional, HEENT, cardiovascular, pulmonary, gi and gu systems are negative, except as otherwise noted.    OBJECTIVE:     /65 (BP Location: Right arm, Patient Position: Chair, Cuff Size: Adult Regular)  Pulse 80  Temp 97.1  F (36.2  C) (Oral)  Wt 135 lb (61.2 kg)  LMP 2018  SpO2 97%  BMI 24.69 kg/m2  Body mass index is 24.69 kg/(m^2).  GENERAL: healthy, alert and no distress  NECK: no adenopathy, no asymmetry, masses, or scars and thyroid normal to palpation  RESP: lungs clear to auscultation - no rales, rhonchi or wheezes  CV: regular rate and rhythm, normal S1 S2, no S3 or S4, no  murmur, click or rub, no peripheral edema and peripheral pulses strong  ABDOMEN: soft, nontender, no hepatosplenomegaly, no masses and bowel sounds normal  MS: no gross musculoskeletal defects noted, no edema    Diagnostic Test Results:  none     ASSESSMENT/PLAN:       ICD-10-CM    1. Malpositioned intrauterine device (IUD), sequela T83.32XS      Explained risk, benefit, alternative of waiting for the procedure. I advised that I think she should go ahead and have everything done next week, but she declines. Wants to wait until she is back from travel.   I advised that I would call Dr. Cota's office to let them know.   She wants to schedule with a provider who operates at [facility].   Advised to use a back up method of contraception - declines rx for OCPs.     See Patient Instructions    Lauren A. Engelmann, MD  [facility]

## 2018-11-23 NOTE — MR AVS SNAPSHOT
After Visit Summary   11/23/2018    Adina Sánchez    MRN: [id number]           Patient Information     Date Of Birth          1980        Visit Information        Provider Department      11/23/2018 8:00 AM Engelmann, Lauren Anneliese, MD; [employer] [facility]        Today's Diagnoses     Malpositioned intrauterine device (IUD), sequela    -  1      Care Instructions      Before Your Surgery      Call your surgeon if there is any change in your health. This includes signs of a cold or flu (such as a sore throat, runny nose, cough, rash or fever).    Do not smoke, drink alcohol or take over the counter medicine (unless your surgeon or primary care doctor tells you to) for the 24 hours before and after surgery.    If you take prescribed drugs: Follow your doctor s orders about which medicines to take and which to stop until after surgery.    Eating and drinking prior to surgery: follow the instructions from your surgeon    Take a shower or bath the night before surgery. Use the soap your surgeon gave you to gently clean your skin. If you do not have soap from your surgeon, use your regular soap. Do not shave or scrub the surgery site.  Wear clean pajamas and have clean sheets on your bed.           Follow-ups after your visit        Your next 10 appointments already scheduled     Dec 10, 2018 10:30 AM CST   Office Visit with Flores Borges MD   [facility] ([facility]    [address] [place] [id number] [phone]           Bring a current list of meds and any records pertaining to this visit. For Physicals, please bring immunization records and any forms needing to be filled out. Please arrive 10 minutes early to complete paperwork.              Who to contact     If you have questions or need follow up information about today's clinic visit or your schedule please contact [facility] directly at [phone].  Normal or non-critical lab and imaging results will be communicated to you by MyChart, letter or phone within 4 business days after the clinic has received the results. If you do not hear from us within 7 days, please contact the clinic through MyChart or phone. If you have a critical or abnormal lab result, we will notify you by phone as soon as possible.  Submit refill requests through [employer] or call your pharmacy and they will forward the refill request to us. Please allow 3 business days for your refill to be completed.          Additional Information About Your Visit        Care EveryWhere ID     This is your Care EveryWhere ID. This could be used by other organizations to access your [facility] medical records  [id number]        Your Vitals Were     Pulse Temperature Last Period Pulse Oximetry BMI (Body Mass Index)       80 97.1  F (36.2  C) (Oral) 11/08/2018 97% 24.69 kg/m2        Blood Pressure from Last 3 Encounters:   11/23/18 101/65   11/20/18 102/69   11/01/18 99/55    Weight from Last 3 Encounters:   11/23/18 135 lb (61.2 kg)   11/20/18 137 lb (62.1 kg)   11/01/18 135 lb (61.2 kg)              Today, you had the following     No orders found for display       Primary Care Provider Office Phone # Fax #    Thu Anneliese Engelmann, [phone]       [facility] [address]  [facility] [address]        Equal Access to Services     ROSY RAMOS : Hadii aad ku hadasho Soomaali, waaxda luqadaha, qaybta kaalmada adeegyada, waxay tania haylenin gutierrez . So [facility] [phone].    ATENCIÓN: Si habla español, tiene a garcia disposición servicios gratuitos de asistencia lingüística. Llame al [phone].    We comply with applicable federal civil rights laws and [place] laws. We do not discriminate on the basis of race, color, national origin, age, disability, sex, sexual orientation, or gender  identity.            Thank you!     Thank you for choosing [facility]  for your care. Our goal is always to provide you with excellent care. Hearing back from our patients is one way we can continue to improve our services. Please take a few minutes to complete the written survey that you may receive in the mail after your visit with us. Thank you!             Your Updated Medication List - Protect others around you: Learn how to safely use, store and throw away your medicines at www.disposemymeds.org.          This list is accurate as of 11/23/18  9:02 AM.  Always use your most recent med list.                   Brand Name Dispense Instructions for use Diagnosis    fluticasone propionate 0.005 % ointment    CUTIVATE    60 g    Apply topically 2 times daily    Other eczema       levonorgestrel 20 MCG/24HR IUD    MIRENA    1 each    1 each (20 mcg) by Intrauterine route once for 1 dose    Encounter for IUD insertion       Prenatal Vitamin 27-0.8 MG Tabs

## 2018-11-26 NOTE — TELEPHONE ENCOUNTER
Called through  services and confirmed patient ws canceling her surgery with Dr. Cota on 11.30.18. Will route to provider so that he knows of the changed.     Jolie Puente  Women's Health

## 2018-11-28 NOTE — TELEPHONE ENCOUNTER
Spoke with [facility] Surgery Scheduler Josefina- surgery was cancelled.  Removed from provider's surgery schedule.  Per patient's pre-op notes- patient wants to have surgery done in January with a provider that operates at the [facility].  Will close encounter.

## 2018-11-29 NOTE — PROGRESS NOTES
is present for the discussion     Adina is a 38 year old female, .   Dr. Engelmann has referred Adina in consultation regarding the IUD being malpositioned, and and ovarian cyst.   The patient states she is here today for the IUD to be removed and another inserted due to malposition of the IUD.  She reports that the current Mirena IUD was inserted in 2017, about one month after her  section.  She reports having monthly bleeding since the IUD was inserted.   She reports having pelvic pain that lasted about 15 minutes. It occurred when she was falling asleep one night in October.  It resolved.    She had menses in October.     She had the following ultrasound ordered and we reviewed the ultrasound report and the films.      ULTRASOUND PELVIC COMPLETE WITH TRANSVAGINAL IMAGING  2018 11:03 AM   HISTORY: Intrauterine device in place, bilateral pelvic pain. Pelvic pain in female.   FINDINGS:  Transvaginal images were performed to better evaluate the patient's uterus, ovaries and endometrial stripe.  The uterus is mildly prominent in size measuring 10.8 x 3.2 x 5.6 cm. No fibroids are evident. Endometrial stripe measures 3 mm and is normal for patient's age. IUD appears to be embedded within the myometrium posterior to the endometrial stripe. No evidence of extension beyond the uterine myometrium. The right ovary measures 3.6 x 3.2 x 2.8 cm and contains a complex-appearing cystic lesion measuring 2.0 x 1.7 x 1.5 cm. This may be collapsing corpus luteal cyst or resolving hemorrhagic cyst. The left ovary is normal measuring 2.3 x 2.3 x 1.3 cm. The ovaries demonstrate normal color Doppler flow bilaterally. No adnexal masses are present. No free pelvic fluid is present.  IMPRESSION:   1. Malpositioned IUD appears embedded within the posterior uterine body myometrium slightly to the right of midline. Follow-up as clinically warranted.  2. Complex-appearing cyst right ovary likely  physiologic. This may be a collapsing corpus luteal cyst or resolving hemorrhagic cyst. Follow-up ultrasound in 2 or 3 months could be performed to assess for stability.      Past Medical History:   Diagnosis Date     Cardiomyopathy (H)      Past Surgical History:   Procedure Laterality Date      SECTION          Allergies   Allergen Reactions     Avocado      Current Outpatient Prescriptions   Medication Sig Dispense Refill     levonorgestrel (MIRENA) 20 MCG/24HR IUD 1 each (20 mcg) by Intrauterine route once for 1 dose 1 each 0     fluticasone propionate (CUTIVATE) 0.005 % ointment Apply topically 2 times daily (Patient not taking: Reported on 2018) 60 g 3     Prenatal Vit-Fe Fumarate-FA (PRENATAL VITAMIN) 27-0.8 MG TABS          Social History     Social History     Marital status:      Spouse name: N/A     Number of children: 2     Years of education: N/A     Occupational History     Not on file.     Social History Main Topics     Smoking status: Never Smoker     Smokeless tobacco: Never Used     Alcohol use Not on file     Drug use: Not on file     Sexual activity: Not on file     Other Topics Concern     Not on file     Social History Narrative       Family History   Problem Relation Age of Onset     Diabetes Sister      No Known Problems Son      No Known Problems Son        Review of Systems:  10 point ROS of systems including Constitutional, Eyes, Respiratory, Cardiovascular, Gastroenterology, Genitourinary, Integumentary, Muscularskeletal, Psychiatric were all negative except for pertinent positives noted in my HPI and in the PMH.      Exam  /69 (BP Location: Right arm, Cuff Size: Adult Regular)  Pulse 61  Resp 16  Wt 137 lb (62.1 kg)  LMP 2018  SpO2 99%  BMI 25.06 kg/m2  General:  WNWD female, NAD  Alert  Oriented x 3  Gait:  Normal  Skin:  Normal skin turgor  HEENT:  NC/AT, EOMI  Abdomen:  Non-tender, non-distended.  Pelvic exam:  Not performed  Extremities:  No  clubbing, no cyanosis and no edema.      Assessment  Malpositioned IUD  Complex Right ovarian cyst, likely physiologic      Plan  The patient and I reviewed the ultrasound report and the films. Regarding the ovarian cyst, the cyst is likely physiologic and the pain might have been related to ovulation, as it appears it is a resolving corpus luteal cyst on imaging.    We reviewed the likely location of the IUD and that it appears to be imbedded into the myometrium.  She would like to have the IUD removed and replaced today, but with the location of the IUD, I feel that the hysteroscopic removal of the IUD would better.  The IUD might break if attempted in the clinic and if it is done in the OR, there might be better retrieval of the IUD and if breakage occurs, the IUD fragments.  The patient and I discussed the Hysteroscopy and the IUD removal and the associated risks and benefits and goals and limitations.  The ACOG pamphlet was given and reviewed.    The risks of the procedure are reviewed and include but are not limited to, death, brain damage, paralysis of any or all limbs, loss of an organ, function of an organ, disfiguring scars, bleeding, infection, damage to the uterus, tubes, ovaries, bowel, bladder, cervix and vagina.  In addition, there is a possibility of other procedures that might be deemed necessary at the time of the surgery, depending upon findings and/or complications.  This may also include laparoscopy, laparotomy, and rarely colostomy (which often times can be reversible in the future).  Risks with blood include but are not limited to HIV/AIDS, hepatitis and transfusion reactions, with transfusion reactions being the greatest risk.    TT 30 min  CT greater than 50%    Jhon Cota MD

## 2021-05-03 ENCOUNTER — APPOINTMENT (OUTPATIENT)
Dept: INTERPRETER SERVICES | Facility: CLINIC | Age: 41
End: 2021-05-03

## 2021-05-04 ENCOUNTER — IMMUNIZATION (OUTPATIENT)
Dept: NURSING | Facility: CLINIC | Age: 41
End: 2021-05-04
Payer: OTHER GOVERNMENT

## 2021-05-04 PROCEDURE — 0001A PR COVID VAC PFIZER DIL RECON 30 MCG/0.3 ML IM: CPT

## 2021-05-04 PROCEDURE — 91300 PR COVID VAC PFIZER DIL RECON 30 MCG/0.3 ML IM: CPT

## 2021-05-25 ENCOUNTER — IMMUNIZATION (OUTPATIENT)
Dept: NURSING | Facility: CLINIC | Age: 41
End: 2021-05-25
Attending: INTERNAL MEDICINE
Payer: OTHER GOVERNMENT

## 2021-05-25 PROCEDURE — 0002A PR COVID VAC PFIZER DIL RECON 30 MCG/0.3 ML IM: CPT

## 2021-05-25 PROCEDURE — 91300 PR COVID VAC PFIZER DIL RECON 30 MCG/0.3 ML IM: CPT

## 2021-12-06 ENCOUNTER — IMMUNIZATION (OUTPATIENT)
Dept: NURSING | Facility: CLINIC | Age: 41
End: 2021-12-06
Payer: OTHER GOVERNMENT

## 2021-12-06 PROCEDURE — 0004A PR COVID VAC PFIZER DIL RECON 30 MCG/0.3 ML IM: CPT

## 2021-12-06 PROCEDURE — 91300 PR COVID VAC PFIZER DIL RECON 30 MCG/0.3 ML IM: CPT

## 2023-10-17 ENCOUNTER — APPOINTMENT (OUTPATIENT)
Dept: INTERPRETER SERVICES | Facility: CLINIC | Age: 43
End: 2023-10-17

## 2023-10-19 ENCOUNTER — APPOINTMENT (OUTPATIENT)
Dept: INTERPRETER SERVICES | Facility: CLINIC | Age: 43
End: 2023-10-19